# Patient Record
Sex: FEMALE | Race: OTHER | NOT HISPANIC OR LATINO | URBAN - METROPOLITAN AREA
[De-identification: names, ages, dates, MRNs, and addresses within clinical notes are randomized per-mention and may not be internally consistent; named-entity substitution may affect disease eponyms.]

---

## 2023-10-16 ENCOUNTER — EMERGENCY (EMERGENCY)
Facility: HOSPITAL | Age: 28
LOS: 0 days | Discharge: HOME | End: 2023-10-16
Attending: EMERGENCY MEDICINE
Payer: MEDICAID

## 2023-10-16 VITALS
WEIGHT: 111.99 LBS | SYSTOLIC BLOOD PRESSURE: 139 MMHG | RESPIRATION RATE: 16 BRPM | HEART RATE: 81 BPM | OXYGEN SATURATION: 99 % | DIASTOLIC BLOOD PRESSURE: 81 MMHG | TEMPERATURE: 98 F | HEIGHT: 72 IN

## 2023-10-16 VITALS
TEMPERATURE: 98 F | OXYGEN SATURATION: 99 % | RESPIRATION RATE: 18 BRPM | DIASTOLIC BLOOD PRESSURE: 76 MMHG | SYSTOLIC BLOOD PRESSURE: 125 MMHG | HEART RATE: 75 BPM
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
## 2023-10-16 DIAGNOSIS — F17.200 NICOTINE DEPENDENCE, UNSPECIFIED, UNCOMPLICATED: ICD-10-CM
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
## 2023-10-16 DIAGNOSIS — R07.89 OTHER CHEST PAIN: ICD-10-CM

## 2023-10-16 DIAGNOSIS — M54.50 LOW BACK PAIN, UNSPECIFIED: ICD-10-CM

## 2023-10-16 DIAGNOSIS — S32.2XXA FRACTURE OF COCCYX, INITIAL ENCOUNTER FOR CLOSED FRACTURE: ICD-10-CM

## 2023-10-16 DIAGNOSIS — R07.81 PLEURODYNIA: ICD-10-CM

## 2023-10-16 DIAGNOSIS — J45.909 UNSPECIFIED ASTHMA, UNCOMPLICATED: ICD-10-CM
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
## 2023-10-16 DIAGNOSIS — M25.521 PAIN IN RIGHT ELBOW: ICD-10-CM

## 2023-10-16 PROCEDURE — 99284 EMERGENCY DEPT VISIT MOD MDM: CPT | Mod: 25

## 2023-10-16 PROCEDURE — 72220 X-RAY EXAM SACRUM TAILBONE: CPT | Mod: 26

## 2023-10-16 PROCEDURE — 72220 X-RAY EXAM SACRUM TAILBONE: CPT

## 2023-10-16 PROCEDURE — 71045 X-RAY EXAM CHEST 1 VIEW: CPT

## 2023-10-16 PROCEDURE — 71045 X-RAY EXAM CHEST 1 VIEW: CPT | Mod: 26

## 2023-10-16 PROCEDURE — 99284 EMERGENCY DEPT VISIT MOD MDM: CPT

## 2023-10-16 PROCEDURE — 96372 THER/PROPH/DIAG INJ SC/IM: CPT

## 2023-10-16 RX ORDER — KETOROLAC TROMETHAMINE 30 MG/ML
30 SYRINGE (ML) INJECTION ONCE
Refills: 0 | Status: DISCONTINUED | OUTPATIENT
Start: 2023-10-16 | End: 2023-10-16

## 2023-10-16 RX ADMIN — Medication 30 MILLIGRAM(S): at 22:06

## 2023-10-16 RX ADMIN — Medication 30 MILLIGRAM(S): at 21:36

## 2023-10-16 NOTE — ED ADULT NURSE NOTE - NSFALLRISKINTERV_ED_ALL_ED

## 2023-10-16 NOTE — ED PROVIDER NOTE - NSFOLLOWUPINSTRUCTIONS_ED_ALL_ED_FT
Use donut cushion when sitting down.     Tailbone Injury  A side view of a person's spine with a close-up showing the coccyx.  The tailbone is the small bone at the lower end of the spine. The tailbone is also called the coccyx.A tailbone injury may involve stretched ligaments, bruising, or a broken bone/break (fracture). Tailbone injuries can be painful, and some may take a long time to heal.    What are the causes?  This condition may be caused by:  Falling and landing on the tailbone.  Repeated strain or friction from sitting for long periods of time. This may include actions such as rowing or bicycling.  Childbirth.  In some cases, the cause may not be known.    What are the signs or symptoms?  Symptoms of this condition include:  Pain in the tailbone area or lower back, especially when sitting.  Pain or difficulty when standing up from a sitting position.  Bruising or swelling in the tailbone area.  Painful bowel movements.  In females, pain during sex.  How is this diagnosed?  This condition may be diagnosed based on your symptoms and a physical exam.    If your health care provider suspects a fracture, you may have additional tests, such as:  X-rays.  CT scan.  MRI.  How is this treated?  Most tailbone injuries heal on their own in 4–6 weeks. However, recovery time may be longer if there is a fracture.    If treatment is needed, it may include:  NSAIDs or other over-the-counter medicines to help relieve your pain.  Rubber or inflated ring or cushion to take pressure off the tailbone when sitting.  Physical therapy.  Injection with local anesthesia and steroid medicine. This is done only if the pain does not improve over time with over-the-counter pain medicines.  Follow these instructions at home:  Activity    Rest as told by your health care provider.  Do not sit for a long time without moving. Get up to take short walks every 1–2 hours. This will improve blood flow and breathing. Ask for help if you feel weak or unsteady.  Wear appropriate padding and sports gear when bicycling or rowing. This will prevent repeating an injury that is caused by strain or friction.  Do exercises as told by your health care provider.  Increase your activity as the pain allows.  Return to your normal activities as told by your health care provider. Ask your health care provider what activities are safe for you.  Managing pain, stiffness, and swelling    To help decrease discomfort when sitting:  Sit on your rubber or inflated ring or cushion as told by your health care provider.  Lean forward when you sit.  If directed, put ice on the injured area. To do this:  Put ice in a plastic bag.  Place a towel between your skin and the bag.  Leave the ice on for 20 minutes, 2–3 times per day for the first 1–2 days.  If your skin turns bright red, remove the ice right away to prevent skin damage. The risk of skin damage is higher if you cannot feel pain, heat, or cold.  If directed, apply heat to the affected area as often as told by your health care provider. Use the heat source that your health care provider recommends, such as a moist heat pack or a heating pad.  Place a towel between your skin and the heat source.  Leave the heat on for 20–30 minutes.  If your skin turns bright red, remove the heat right away to prevent burns. The risk of burns is higher if you cannot feel pain, heat, or cold.  General instructions    Take over-the-counter and prescription medicines only as told by your health care provider.  Your condition or medicine may cause constipation or painful bowel movements. To prevent or treat constipation, you may need to:  Drink enough fluid to keep your urine pale yellow.  Take over-the-counter or prescription medicines.  Eat foods that are high in fiber, such as beans, whole grains, and fresh fruits and vegetables.  Limit foods that are high in fat and processed sugars, such as fried or sweet foods.  Contact a health care provider if:  Your pain becomes worse or is not controlled with medicine.  Your bowel movements cause a great deal of discomfort.  You are unable to have a bowel movement after 4 days.  You have pain during sex.  Summary  A tailbone injury may involve stretched ligaments, bruising, or a broken bone/break (fracture).  Tailbone injuries can be painful. Most heal on their own in 4–6 weeks.  Treatment may include taking NSAIDs, doing physical therapy, or using a rubber or inflated ring or cushion when sitting.  Follow any recommendations from your health care provider to prevent or treat constipation.  This information is not intended to replace advice given to you by your health care provider. Make sure you discuss any questions you have with your health care provider.

## 2023-10-16 NOTE — ED PROVIDER NOTE - ATTENDING CONTRIBUTION TO CARE
26 yo F with PMH of asthma presents to the ER for tailbone pain after a fall 2 days ago. States she was carrying a box down the stairs, slipped and fell onto her buttocks and then slid down the stairs. Now with pain to the tailbone and to the right side of her anterior chest. Denies any LOC or use of blood thinners. Denies any N/V/D/SOB/abdomen pain/hematuria/rash/bruising/cough/fever/chills/dizziness/palpitations/calf pain/numbness or weakness. +pain when sitting to her sacrum area. No issues with BMs (no blood, no incontinence etc)    AVSS Pt in NAD, NCAT, PERRL, EOMI. No c-spine/T/L spine tenderness or step off. +mild lower sacrum/coccygeal discomfort with palpation, with no associated skin discoloration or edema. No numbness to this region (sacral/buttock). Lungs: CTAB. Heart: regular s1s2. Chest wall: +mild tenderness to anterior right lower ribs with no surrounding bruising/swelling/redness or crepitus. Abdomen: soft nt/nd +BS, no mass. no rebound or guarding, Ext no edema, no calf tenderness, no extremity joint swelling/deformity, +FROM of all extremities. Neuro-motor-vascular exam intact. Neuro: intact.     A/P pt likely injured her tailbone. Will check XRs of chest/ribs and sacrum/coccyx. Recommend NSAIDs for pain control and use of a donut shaped cushion for her to place under her when sitting. Recommend f/u with PMD for reevaluation in 2 weeks. Return precautions given. Informed this type of injury generally just requires time to heal on own.     ALL: nkda  Meds albuterol prn, ibuprofen  SH +smoker  LMP last month, has regular periods.

## 2023-10-16 NOTE — ED ADULT TRIAGE NOTE - CHIEF COMPLAINT QUOTE
"2 nights ago, I slid down a flight of stairs, about 10 steps. My tailbone hurts." Denies head injury, denies LOC.

## 2023-10-16 NOTE — ED PROVIDER NOTE - CLINICAL SUMMARY MEDICAL DECISION MAKING FREE TEXT BOX
26 yo F with PMH of asthma presents to the ER for tailbone pain after a fall 2 days ago. States she was carrying a box down the stairs, slipped and fell onto her buttocks and then slid down the stairs. Now with pain to the tailbone and to the right side of her anterior chest. Denies any LOC or use of blood thinners. Denies any N/V/D/SOB/abdomen pain/hematuria/rash/bruising/cough/fever/chills/dizziness/palpitations/calf pain/numbness or weakness. +pain when sitting to her sacrum area. No issues with BMs (no blood, no incontinence etc)    AVSS Pt in NAD, NCAT, PERRL, EOMI. No c-spine/T/L spine tenderness or step off. +mild lower sacrum/coccygeal discomfort with palpation, with no associated skin discoloration or edema. No numbness to this region (sacral/buttock). Lungs: CTAB. Heart: regular s1s2. Chest wall: +mild tenderness to anterior right lower ribs with no surrounding bruising/swelling/redness or crepitus. Abdomen: soft nt/nd +BS, no mass. no rebound or guarding, Ext no edema, no calf tenderness, no extremity joint swelling/deformity, +FROM of all extremities. Neuro-motor-vascular exam intact. Neuro: intact.     A/P pt likely injured her tailbone. Will check XRs of chest/ribs and sacrum/coccyx. Recommend NSAIDs for pain control and use of a donut shaped cushion for her to place under her when sitting. Recommend f/u with PMD for reevaluation in 2 weeks. Return precautions given. Informed this type of injury generally just requires time to heal on own.

## 2023-10-16 NOTE — ED PROVIDER NOTE - PATIENT PORTAL LINK FT
You can access the FollowMyHealth Patient Portal offered by [facility] by registering at the following website: [email or web address]. By joining [employer]’s FollowMyHealth portal, you will also be able to view your health information using other applications (apps) compatible with our system.

## 2023-10-16 NOTE — ED PROVIDER NOTE - PHYSICAL EXAMINATION
CONSTITUTIONAL: Well-developed; well-nourished; in no acute distress.   SKIN: Warm, dry  HEAD: Normocephalic; atraumatic  EYES: EOMI, normal sclera and conjunctiva   ENT: No nasal discharge; airway clear.  CARD:  Regular rate and rhythm. Normal S1, S2. 2+ radial, DP pulses  RESP: No increased WOB. CTA b/l without wheezes, crackles, rhonchi. Mild right anterior chest wall pain.   ABD: Soft, nontender, nondistended. No CVA tenderness  BACK: No midline spinal tenderness C/T/L. Tenderness over coccyx.   EXT: Normal ROM. Upper and lower extremity joints no deformities or tenderness.   NEURO: Alert, oriented, grossly unremarkable  PSYCH: Cooperative, appropriate.

## 2023-10-16 NOTE — ED PROVIDER NOTE - OBJECTIVE STATEMENT
26 y/o F with no PMH presenting for lower back pain x2 days. Pt reports she slipped while going down stairs holding a box. Slid down approximately 7-8 steps. Also reports anterior chest pain and right elbow pain. Denies difficulties breathing, SOB on exertion, saddle anesthesia, changes in urination/defecation.

## 2023-11-03 ENCOUNTER — EMERGENCY (EMERGENCY)
Facility: HOSPITAL | Age: 28
LOS: 0 days | Discharge: HOME | End: 2023-11-03
Attending: EMERGENCY MEDICINE
Payer: MEDICAID

## 2023-11-03 VITALS
HEIGHT: 72 IN | DIASTOLIC BLOOD PRESSURE: 73 MMHG | TEMPERATURE: 100 F | RESPIRATION RATE: 19 BRPM | SYSTOLIC BLOOD PRESSURE: 141 MMHG | OXYGEN SATURATION: 96 % | HEART RATE: 100 BPM | WEIGHT: 111.99 LBS

## 2023-11-03 DIAGNOSIS — R05.9 COUGH, UNSPECIFIED: ICD-10-CM

## 2023-11-03 DIAGNOSIS — R06.2 WHEEZING: ICD-10-CM

## 2023-11-03 DIAGNOSIS — R09.89 OTHER SPECIFIED SYMPTOMS AND SIGNS INVOLVING THE CIRCULATORY AND RESPIRATORY SYSTEMS: ICD-10-CM

## 2023-11-03 DIAGNOSIS — R09.81 NASAL CONGESTION: ICD-10-CM

## 2023-11-03 DIAGNOSIS — J02.9 ACUTE PHARYNGITIS, UNSPECIFIED: ICD-10-CM

## 2023-11-03 PROCEDURE — 94640 AIRWAY INHALATION TREATMENT: CPT

## 2023-11-03 PROCEDURE — 99283 EMERGENCY DEPT VISIT LOW MDM: CPT | Mod: 25

## 2023-11-03 PROCEDURE — 99283 EMERGENCY DEPT VISIT LOW MDM: CPT

## 2023-11-03 RX ORDER — IBUPROFEN 200 MG
1 TABLET ORAL
Qty: 28 | Refills: 0
Start: 2023-11-03 | End: 2023-11-16

## 2023-11-03 RX ORDER — ALBUTEROL 90 UG/1
1 AEROSOL, METERED ORAL ONCE
Refills: 0 | Status: COMPLETED | OUTPATIENT
Start: 2023-11-03 | End: 2023-11-03

## 2023-11-03 RX ORDER — IBUPROFEN 200 MG
400 TABLET ORAL ONCE
Refills: 0 | Status: COMPLETED | OUTPATIENT
Start: 2023-11-03 | End: 2023-11-03

## 2023-11-03 RX ADMIN — Medication 400 MILLIGRAM(S): at 09:40

## 2023-11-03 RX ADMIN — ALBUTEROL 1 PUFF(S): 90 AEROSOL, METERED ORAL at 09:40

## 2023-11-03 NOTE — ED PROVIDER NOTE - PATIENT PORTAL LINK FT
You can access the FollowMyHealth Patient Portal offered by [facility] by registering at the following website: [email or web address]. By joining [employer]’s FollowMyHealth portal, you will also be able to view your health information using other applications (apps) compatible with our system.

## 2023-11-03 NOTE — ED PROVIDER NOTE - PHYSICAL EXAMINATION
EXAM:  CONSTITUTIONAL: WA / WN / NAD  HEAD: NCAT  EYES: PERRL; EOMI; anicteric.  ENT: boggy turbinates, mucus in nose  NECK: Supple; no meningeal signs  CARD: RRR; nl S1/S2; no M/R/G. Pulses equal bilaterally.  RESP: Respiratory rate and effort are normal; scattered wheeze  ABD: Soft, NT ND nl bowel sounds; no masses; no rebound  MSK/EXT: No gross deformities; full range of motion.  SKIN: Warm and dry;   NEURO: AAOx3, Motor 5/5 x 4 extremities, Sensations intact to pain and palpation, Cerebellar testing normal  PSYCH: Memory Intact, Normal Affect

## 2023-11-03 NOTE — ED PROVIDER NOTE - OBJECTIVE STATEMENT
27-year-old female to ED with cough cold congestion times few days.  Runny nose sore throat no help with home meds and persistent congestion concern for aggravating her asthma and she ran out of inhalers to ED for eval.  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

## 2023-11-03 NOTE — ED ADULT NURSE NOTE - NSFALLUNIVINTERV_ED_ALL_ED
Bed/Stretcher in lowest position, wheels locked, appropriate side rails in place/Call bell, personal items and telephone in reach/Instruct patient to call for assistance before getting out of bed/chair/stretcher/Non-slip footwear applied when patient is off stretcher/[place] to call system/Physically safe environment - no spills, clutter or unnecessary equipment/Purposeful proactive rounding/Room/bathroom lighting operational, light cord in reach

## 2024-01-27 ENCOUNTER — EMERGENCY (EMERGENCY)
Facility: HOSPITAL | Age: 29
LOS: 0 days | Discharge: LEFT BEFORE DISCHARGE | End: 2024-01-28
Attending: EMERGENCY MEDICINE
Payer: MEDICAID

## 2024-01-27 VITALS
WEIGHT: 151.02 LBS | OXYGEN SATURATION: 100 % | TEMPERATURE: 98 F | HEART RATE: 59 BPM | DIASTOLIC BLOOD PRESSURE: 83 MMHG | SYSTOLIC BLOOD PRESSURE: 142 MMHG | RESPIRATION RATE: 18 BRPM

## 2024-01-27 DIAGNOSIS — K08.89 OTHER SPECIFIED DISORDERS OF TEETH AND SUPPORTING STRUCTURES: ICD-10-CM

## 2024-01-27 DIAGNOSIS — Z53.29 PROCEDURE AND TREATMENT NOT CARRIED OUT BECAUSE OF PATIENT'S DECISION FOR OTHER REASONS: ICD-10-CM

## 2024-01-27 DIAGNOSIS — K04.7 PERIAPICAL ABSCESS WITHOUT SINUS: ICD-10-CM

## 2024-01-27 DIAGNOSIS — R22.0 LOCALIZED SWELLING, MASS AND LUMP, HEAD: ICD-10-CM

## 2024-01-27 LAB
ANION GAP SERPL CALC-SCNC: 11 MMOL/L — SIGNIFICANT CHANGE UP (ref 7–14)
BASOPHILS # BLD AUTO: 0.02 K/UL — SIGNIFICANT CHANGE UP (ref 0–0.2)
BASOPHILS NFR BLD AUTO: 0.2 % — SIGNIFICANT CHANGE UP (ref 0–1)
BUN SERPL-MCNC: 9 MG/DL — LOW (ref 10–20)
CALCIUM SERPL-MCNC: 9.3 MG/DL — SIGNIFICANT CHANGE UP (ref 8.4–10.5)
CHLORIDE SERPL-SCNC: 103 MMOL/L — SIGNIFICANT CHANGE UP (ref 98–110)
CO2 SERPL-SCNC: 23 MMOL/L — SIGNIFICANT CHANGE UP (ref 17–32)
CREAT SERPL-MCNC: 0.6 MG/DL — LOW (ref 0.7–1.5)
EGFR: 127 ML/MIN/1.73M2 — SIGNIFICANT CHANGE UP
EOSINOPHIL # BLD AUTO: 0.01 K/UL — SIGNIFICANT CHANGE UP (ref 0–0.7)
EOSINOPHIL NFR BLD AUTO: 0.1 % — SIGNIFICANT CHANGE UP (ref 0–8)
GLUCOSE SERPL-MCNC: 94 MG/DL — SIGNIFICANT CHANGE UP (ref 70–99)
HCG SERPL QL: NEGATIVE — SIGNIFICANT CHANGE UP
HCT VFR BLD CALC: 38.8 % — SIGNIFICANT CHANGE UP (ref 37–47)
HGB BLD-MCNC: 13.3 G/DL — SIGNIFICANT CHANGE UP (ref 12–16)
IMM GRANULOCYTES NFR BLD AUTO: 0.4 % — HIGH (ref 0.1–0.3)
LYMPHOCYTES # BLD AUTO: 1.53 K/UL — SIGNIFICANT CHANGE UP (ref 1.2–3.4)
LYMPHOCYTES # BLD AUTO: 17.9 % — LOW (ref 20.5–51.1)
MCHC RBC-ENTMCNC: 33 PG — HIGH (ref 27–31)
MCHC RBC-ENTMCNC: 34.3 G/DL — SIGNIFICANT CHANGE UP (ref 32–37)
MCV RBC AUTO: 96.3 FL — SIGNIFICANT CHANGE UP (ref 81–99)
MONOCYTES # BLD AUTO: 0.7 K/UL — HIGH (ref 0.1–0.6)
MONOCYTES NFR BLD AUTO: 8.2 % — SIGNIFICANT CHANGE UP (ref 1.7–9.3)
NEUTROPHILS # BLD AUTO: 6.27 K/UL — SIGNIFICANT CHANGE UP (ref 1.4–6.5)
NEUTROPHILS NFR BLD AUTO: 73.2 % — SIGNIFICANT CHANGE UP (ref 42.2–75.2)
NRBC # BLD: 0 /100 WBCS — SIGNIFICANT CHANGE UP (ref 0–0)
PLATELET # BLD AUTO: 249 K/UL — SIGNIFICANT CHANGE UP (ref 130–400)
PMV BLD: 10.3 FL — SIGNIFICANT CHANGE UP (ref 7.4–10.4)
POTASSIUM SERPL-MCNC: 4.7 MMOL/L — SIGNIFICANT CHANGE UP (ref 3.5–5)
POTASSIUM SERPL-SCNC: 4.7 MMOL/L — SIGNIFICANT CHANGE UP (ref 3.5–5)
RBC # BLD: 4.03 M/UL — LOW (ref 4.2–5.4)
RBC # FLD: 12.4 % — SIGNIFICANT CHANGE UP (ref 11.5–14.5)
SODIUM SERPL-SCNC: 137 MMOL/L — SIGNIFICANT CHANGE UP (ref 135–146)
WBC # BLD: 8.56 K/UL — SIGNIFICANT CHANGE UP (ref 4.8–10.8)
WBC # FLD AUTO: 8.56 K/UL — SIGNIFICANT CHANGE UP (ref 4.8–10.8)

## 2024-01-27 PROCEDURE — 36415 COLL VENOUS BLD VENIPUNCTURE: CPT

## 2024-01-27 PROCEDURE — 84703 CHORIONIC GONADOTROPIN ASSAY: CPT

## 2024-01-27 PROCEDURE — 85025 COMPLETE CBC W/AUTO DIFF WBC: CPT

## 2024-01-27 PROCEDURE — 96374 THER/PROPH/DIAG INJ IV PUSH: CPT | Mod: XU

## 2024-01-27 PROCEDURE — 70491 CT SOFT TISSUE NECK W/DYE: CPT | Mod: 26,MA

## 2024-01-27 PROCEDURE — 99284 EMERGENCY DEPT VISIT MOD MDM: CPT | Mod: 25

## 2024-01-27 PROCEDURE — 70491 CT SOFT TISSUE NECK W/DYE: CPT | Mod: MA

## 2024-01-27 PROCEDURE — 99285 EMERGENCY DEPT VISIT HI MDM: CPT

## 2024-01-27 PROCEDURE — 80048 BASIC METABOLIC PNL TOTAL CA: CPT

## 2024-01-27 PROCEDURE — 96375 TX/PRO/DX INJ NEW DRUG ADDON: CPT | Mod: XU

## 2024-01-27 RX ORDER — FAMOTIDINE 10 MG/ML
20 INJECTION INTRAVENOUS ONCE
Refills: 0 | Status: COMPLETED | OUTPATIENT
Start: 2024-01-27 | End: 2024-01-27

## 2024-01-27 RX ORDER — ACETAMINOPHEN 500 MG
975 TABLET ORAL ONCE
Refills: 0 | Status: COMPLETED | OUTPATIENT
Start: 2024-01-27 | End: 2024-01-27

## 2024-01-27 RX ORDER — MORPHINE SULFATE 50 MG/1
4 CAPSULE, EXTENDED RELEASE ORAL ONCE
Refills: 0 | Status: DISCONTINUED | OUTPATIENT
Start: 2024-01-27 | End: 2024-01-27

## 2024-01-27 RX ORDER — AMPICILLIN SODIUM AND SULBACTAM SODIUM 250; 125 MG/ML; MG/ML
3 INJECTION, POWDER, FOR SUSPENSION INTRAMUSCULAR; INTRAVENOUS ONCE
Refills: 0 | Status: COMPLETED | OUTPATIENT
Start: 2024-01-27 | End: 2024-01-27

## 2024-01-27 RX ORDER — DIPHENHYDRAMINE HCL 50 MG
25 CAPSULE ORAL ONCE
Refills: 0 | Status: COMPLETED | OUTPATIENT
Start: 2024-01-27 | End: 2024-01-27

## 2024-01-27 RX ORDER — IBUPROFEN 200 MG
600 TABLET ORAL ONCE
Refills: 0 | Status: COMPLETED | OUTPATIENT
Start: 2024-01-27 | End: 2024-01-27

## 2024-01-27 RX ORDER — SODIUM CHLORIDE 9 MG/ML
1000 INJECTION INTRAMUSCULAR; INTRAVENOUS; SUBCUTANEOUS ONCE
Refills: 0 | Status: COMPLETED | OUTPATIENT
Start: 2024-01-27 | End: 2024-01-27

## 2024-01-27 RX ADMIN — Medication 600 MILLIGRAM(S): at 23:26

## 2024-01-27 RX ADMIN — Medication 975 MILLIGRAM(S): at 21:40

## 2024-01-27 RX ADMIN — MORPHINE SULFATE 4 MILLIGRAM(S): 50 CAPSULE, EXTENDED RELEASE ORAL at 19:48

## 2024-01-27 RX ADMIN — AMPICILLIN SODIUM AND SULBACTAM SODIUM 200 GRAM(S): 250; 125 INJECTION, POWDER, FOR SUSPENSION INTRAMUSCULAR; INTRAVENOUS at 19:48

## 2024-01-27 RX ADMIN — Medication 25 MILLIGRAM(S): at 21:07

## 2024-01-27 RX ADMIN — FAMOTIDINE 20 MILLIGRAM(S): 10 INJECTION INTRAVENOUS at 19:13

## 2024-01-27 RX ADMIN — Medication 125 MILLIGRAM(S): at 19:13

## 2024-01-27 RX ADMIN — Medication 25 MILLIGRAM(S): at 19:45

## 2024-01-27 RX ADMIN — SODIUM CHLORIDE 1000 MILLILITER(S): 9 INJECTION INTRAMUSCULAR; INTRAVENOUS; SUBCUTANEOUS at 19:50

## 2024-01-28 ENCOUNTER — EMERGENCY (EMERGENCY)
Facility: HOSPITAL | Age: 29
LOS: 0 days | Discharge: HOME | End: 2024-01-28
Attending: STUDENT IN AN ORGANIZED HEALTH CARE EDUCATION/TRAINING PROGRAM
Payer: MEDICAID

## 2024-01-28 VITALS
SYSTOLIC BLOOD PRESSURE: 137 MMHG | TEMPERATURE: 98 F | RESPIRATION RATE: 18 BRPM | WEIGHT: 121.03 LBS | DIASTOLIC BLOOD PRESSURE: 70 MMHG | HEART RATE: 75 BPM | OXYGEN SATURATION: 99 %

## 2024-01-28 DIAGNOSIS — K08.89 OTHER SPECIFIED DISORDERS OF TEETH AND SUPPORTING STRUCTURES: ICD-10-CM

## 2024-01-28 DIAGNOSIS — K02.9 DENTAL CARIES, UNSPECIFIED: ICD-10-CM

## 2024-01-28 DIAGNOSIS — K04.7 PERIAPICAL ABSCESS WITHOUT SINUS: ICD-10-CM

## 2024-01-28 PROCEDURE — 99283 EMERGENCY DEPT VISIT LOW MDM: CPT

## 2024-01-28 PROCEDURE — 99284 EMERGENCY DEPT VISIT MOD MDM: CPT

## 2024-01-28 RX ORDER — IBUPROFEN 200 MG
1 TABLET ORAL
Qty: 12 | Refills: 0
Start: 2024-01-28 | End: 2024-01-30

## 2024-01-28 RX ORDER — KETOROLAC TROMETHAMINE 30 MG/ML
30 SYRINGE (ML) INJECTION ONCE
Refills: 0 | Status: DISCONTINUED | OUTPATIENT
Start: 2024-01-28 | End: 2024-01-28

## 2024-01-28 RX ADMIN — Medication 30 MILLIGRAM(S): at 06:31

## 2024-01-28 NOTE — ED PROVIDER NOTE - CLINICAL SUMMARY MEDICAL DECISION MAKING FREE TEXT BOX
.    Patient here for evaluation of dental pain/periapical abscess.  Patient evaluated by dental service, recommended I&D.  Patient preferred not to have I&D, preferring instead antibiotic therapy.  Antibiotics sent to pharmacy.  Patient pain improved.  Patient is stable for discharge home with outpatient dental follow-up.    .

## 2024-01-28 NOTE — ED PROVIDER NOTE - NSPTACCESSSVCSAPPTDETAILS_ED_ALL_ED_FT
Our Emergency Department Referral Coordinators will be reaching out to you in the next 24-48 hours from 9:00am to 5:00pm with a follow up appointment. Please expect a phone call from the hospital in that time frame. If you do not receive a call or if you have any questions or concerns, you can reach them at   [phone]

## 2024-01-28 NOTE — ED ADULT NURSE NOTE - NSFALLUNIVINTERV_ED_ALL_ED
Bed/Stretcher in lowest position, wheels locked, appropriate side rails in place/Call bell, personal items and telephone in reach/Instruct patient to call for assistance before getting out of bed/chair/stretcher/Non-slip footwear applied when patient is off stretcher/[place] to call system/Physically safe environment - no spills, clutter or unnecessary equipment/Purposeful proactive rounding/Room/bathroom lighting operational, light cord in reach

## 2024-01-28 NOTE — CONSULT NOTE ADULT - ASSESSMENT
Patient is a 28y old  Female who presents with a chief complaint of "Last night my face started to get swollen and im having trouble opening my mouth all of the way. They told me to come back today to have a procedure done."    HPI: Right buccal swelling began last night       PAST MEDICAL & SURGICAL HISTORY:    ( - ) heart valve replacement  ( - ) joint replacement  ( - ) pregnancy    MEDICATIONS  (STANDING):    MEDICATIONS  (PRN):      Allergies    No Known Allergies    Intolerances        FAMILY HISTORY:      *Last Dental Visit: Unknown    Vital Signs Last 24 Hrs  T(C): 36.6 (28 Jan 2024 05:11), Max: 36.6 (27 Jan 2024 17:14)  T(F): 97.9 (28 Jan 2024 05:11), Max: 97.9 (27 Jan 2024 17:14)  HR: 75 (28 Jan 2024 05:11) (59 - 75)  BP: 137/70 (28 Jan 2024 05:11) (137/70 - 142/83)  BP(mean): --  RR: 18 (28 Jan 2024 05:11) (18 - 18)  SpO2: 99% (28 Jan 2024 05:11) (99% - 100%)    Parameters below as of 28 Jan 2024 05:11  Patient On (Oxygen Delivery Method): room air        LABS:                        13.3   8.56  )-----------( 249      ( 27 Jan 2024 19:10 )             38.8     01-27    137  |  103  |  9<L>  ----------------------------<  94  4.7   |  23  |  0.6<L>    Ca    9.3      27 Jan 2024 19:10      WBC Count: 8.56 K/uL [4.80 - 10.80] (01-27 @ 19:10)  Platelet Count - Automated: 249 K/uL [130 - 400] (01-27 @ 19:10)    Urinalysis Basic - ( 27 Jan 2024 19:10 )    Color: x / Appearance: x / SG: x / pH: x  Gluc: 94 mg/dL / Ketone: x  / Bili: x / Urobili: x   Blood: x / Protein: x / Nitrite: x   Leuk Esterase: x / RBC: x / WBC x   Sq Epi: x / Non Sq Epi: x / Bacteria: x          EOE:  TMJ ( - ) clicks                     ( - ) pops                     ( - ) crepitus             Mandible <<FROM>>             Facial bones and MOM <<grossly intact>>             ( - ) trismus             ( - ) lymphadenopathy             ( + ) swelling             ( + ) asymmetry             ( + ) palpation             ( - ) dyspnea             ( - ) dysphagia             ( - ) loss of consciousness    IOE:  permanent dentition: multiple carious teeth, poor oral hygiene           hard/soft palate: <<No pathology noted>>           tongue/FOM <<No pathology noted>>           labial/buccal mucosa <<No pathology noted>>           ( + ) percussion           ( + ) palpation           ( + ) swelling            ( + ) abscess           ( - ) sinus tract        *DENTAL RADIOGRAPHS: None taken    RADIOLOGY & ADDITIONAL STUDIES: Ct scan shown maxillary right abscess associated with non-restorable #2 and right buccal inflammatory changes    *ASSESSMENT: Nonrestorable tooth #2      *PLAN: Discussed treatment options with patient. Recommended strongly that Incision and drainage should be done to prevent further complications and encourage healing. Patient claims "I watched "[employer]ube videos last night of the procedure and it made me scared and I do not want the procedure done". Explained risks of not having the procedure done to patient, she understands. Recommended patient return to clinic to have non-restorable tooth taken out. Patient agrees to return to come to dental clinic to have tooth taken out. Instructed patient to take antibiotics as soon as possible.       RECOMMENDATIONS:  1) Complete antiobiotic course as prescribed and analgesics as necessary for pain  2) Dental F/U with outpatient dentist for comprehensive dental care or [facility] dental clinic   3) If any difficulty swallowing/breathing, fever occur, return to ER.     Resident Name: Trinh Huff, pager #[phone]

## 2024-01-28 NOTE — ED ADULT NURSE NOTE - OBJECTIVE STATEMENT
Pt presented to ed complaining of dental pain/ injury. swelling noted. pt is ao, shows no s/s of sob, labored breathing, pt denies chest pain.

## 2024-01-28 NOTE — ED PROVIDER NOTE - OBJECTIVE STATEMENT
62-year-old female no past medical history coming here for return visit for dental pain.  Was seen earlier last night for similar complaints and had workup including CAT scan and blood work.  Was found to have a periapical abscess and told to come back for drainage by dental resident.  Dental resident starts at 8 AM.

## 2024-01-28 NOTE — ED ADULT NURSE NOTE - PAIN: PRESENCE, MLM
complains of pain/discomfort
pt c/o chest palpitations for 4 months after covid in January states cp, sod for the last few days  denies fever,chills,n/v

## 2024-01-28 NOTE — ED PROVIDER NOTE - NSFOLLOWUPCLINICS_GEN_ALL_ED_FT
[facility]  Dental  [address] [place] [id number]  Phone: [phone]  Fax:   Follow Up Time: 1-3 Days

## 2024-01-28 NOTE — ED PROVIDER NOTE - NSFOLLOWUPINSTRUCTIONS_ED_ALL_ED_FT
Follow-up with dentist.  Take antibiotics as prescribed.      Dental Abscess  Cross-sectional view of two teeth: one tooth is normal and the other tooth has an abscess.  A dental abscess is an area of pus in or around a tooth. It comes from an infection. It can cause pain and other symptoms. Treatment will help with symptoms and prevent the infection from spreading.    What are the causes?  This condition is caused by an infection in or around the tooth. This can be from:  Very bad tooth decay (cavities).  A bad injury to the tooth, such as a broken or chipped tooth.  What increases the risk?  The risk to get an abscess is higher in males. It is also more likely in people who:  Have dental decay.  Have very bad gum disease.  Eat sugary snacks between meals.  Use tobacco.  Have diabetes.  Have a weak disease-fighting system (immune system).  Do not brush their teeth regularly.  What are the signs or symptoms?  Some mild symptoms are:  Tenderness.  Bad breath.  Fever.  A sharp, sour taste in the mouth.  Pain in and around the infected tooth.  Worse symptoms of this condition include:  Swollen neck glands.  Chills.  Pus draining around the tooth.  Swelling and redness around the tooth, the mouth, or the face.  Very bad pain in and around the tooth.  The worst symptoms can include:  Difficulty swallowing.  Difficulty opening your mouth.  Feeling like you may vomit or vomiting.  How is this treated?  This is treated by getting rid of the infection. Your dentist will discuss ways to do this, including:  Antibiotic medicines.  Antibacterial mouth rinse.  An incision in the abscess to drain out the pus.  A root canal.  Removing the tooth.  Follow these instructions at home:  Medicines    Take over-the-counter and prescription medicines only as told by your dentist.  If you were prescribed an antibiotic medicine, take it as told by your dentist. Do not stop taking it even if you start to feel better.  If you were prescribed a gel that has numbing medicine in it, use it exactly as told.  Ask your dentist if you should avoid driving or using machines while you are taking your medicine.  General instructions    Rinse your mouth often with salt water. To make salt water, dissolve ½–1 tsp (3–6 g) of salt in 1 cup (237 mL) of warm water.  Eat a soft diet while your mouth is healing.  Drink enough fluid to keep your pee (urine) pale yellow.  Do not apply heat to the outside of your mouth.  Do not smoke or use any products that contain nicotine or tobacco. If you need help quitting, ask your dentist.  Keep all follow-up visits.  Prevent an abscess    Brush your teeth every morning and every night. Use fluoride toothpaste.  Floss your teeth each day.  Get dental cleanings as often as told by your dentist.  Think about getting dental sealant put on teeth that have deep holes (decay).  Drink water that has fluoride in it.  Most tap water has fluoride.  Check the label on bottled water to see if it has fluoride in it.  Drink water instead of sugary drinks.  Eat healthy meals and snacks.  Wear a mouth guard or face shield when you play sports.  Contact a doctor if:  Your pain is worse and medicine does not help.  Get help right away if:  You have a fever or chills.  Your symptoms suddenly get worse.  You have a very bad headache.  You have problems breathing or swallowing.  You have trouble opening your mouth.  You have swelling in your neck or close to your eye.  These symptoms may be an emergency. Get help right away. Call your local emergency services (911 in the [place]  Do not wait to see if the symptoms will go away.  Do not drive yourself to the hospital.  Summary  A dental abscess is an area of pus in or around a tooth. It is caused by an infection.  Treatment will help with symptoms and prevent the infection from spreading.  Take over-the-counter and prescription medicines only as told by your dentist.  To prevent an abscess, take good care of your teeth. Brush your teeth every morning and night. Use floss every day.  Get dental cleanings as often as told by your dentist.  This information is not intended to replace advice given to you by your health care provider. Make sure you discuss any questions you have with your health care provider.

## 2024-01-28 NOTE — ED ADULT NURSE NOTE - HOW OFTEN DO YOU HAVE A DRINK CONTAINING ALCOHOL?
Impression: S/P MAP (Photocoagulation for Maculopathy laser) OS - 36 Days. Encounter for surgical aftercare following surgery on a sense organ  Z48.810. Post operative instructions reviewed - Plan: --Advised patient to use artificial tears for comfort. Never

## 2024-01-28 NOTE — ED PROVIDER NOTE - OBJECTIVE STATEMENT
Patient is c/o Rt lower molar toothace Patient is c/o Rt lower molar toothache x several days, worse today, associated with difficulty opening mouth and Rt sided facial swelling. Denies trauma. +subjective fever/chills. Denies cp/sob/n/v/abd pain. Denies HA/neck pain/vision changes. Denies eye pain. No rash. Rt lower molar toothache is constant, worse with chewing and worse with opening/closing the mouth. Denies trouble speaking/breathing. No drooling noted.

## 2024-01-28 NOTE — ED PROVIDER NOTE - CLINICAL SUMMARY MEDICAL DECISION MAKING FREE TEXT BOX
Laboratory results reviewed and discussed with patient. CBC shows normal WBC count, Hb/Hct and platelet count are WNL. BMP shows electrolytes WNL and no ANA.   Pregnant test is negative.   Patient remained stable in ED, improved well. Patient refused any further medical care in ED, refused admission, refused any further diagnostic studies or treatments. Discussed with patient in detail about clinical condition, and the need for further medical evaluation/treatments, patient verbalized understanding and still refused. Discussed with patient about the risks of leaving AMA, Patient verbalized understanding the information provided and still wanted to leave AMA. Patient is awake, alert, o x 3, coherent, and is capacitated to make decisions. Discussed with patient in detail about clinical condition, results of the diagnostic studies, was told to come back to ED if decide to continue with medical evaluation/treatments, and was discussed about  the need for close out patient follow up. Detail aftercare instructions and return precautions are given.  I had extensive discussion of risks and benefits of pursuing further medical evaluation and/or care with patient and any available family/friends; patient still electing to leave against medical advice. Patient is awake, alert, oriented and demonstrates full capacity and insight into illness. Patient aware and encouraged to return immediately to ED or nearest ED if patient decides to change mind regarding care or if patient experiences any new, worsening, or concerning symptoms.

## 2024-01-28 NOTE — ED PROVIDER NOTE - PATIENT PORTAL LINK FT
You can access the FollowMyHealth Patient Portal offered by [facility] by registering at the following website: [email or web address]. By joining [employer]’s FollowMyHealth portal, you will also be able to view your health information using other applications (apps) compatible with our system.

## 2024-01-28 NOTE — ED PROVIDER NOTE - PHYSICAL EXAMINATION
CONSTITUTIONAL:  in no acute distress.   SKIN: warm, dry  HEAD: Normocephalic; atraumatic.  EYES: PERRL, EOMI, normal sclera and conjunctiva   ENT: No nasal discharge; airway clear.Dental caries with preapical abscess  NECK: Supple; non tender.  CARD:  Regular rate and rhythm.   RESP: NO inc WOB   ABD: soft ntnd  EXT: Normal ROM.    LYMPH: No acute cervical adenopathy.  NEURO: Alert, oriented, grossly unremarkable  PSYCH: Cooperative, appropriate.

## 2024-01-28 NOTE — ED ADULT NURSE NOTE - NSFALLUNIVINTERV_ED_ALL_ED
Bed/Stretcher in lowest position, wheels locked, appropriate side rails in place/Call bell, personal items and telephone in reach/Instruct patient to call for assistance before getting out of bed/chair/stretcher/Non-slip footwear applied when patient is off stretcher/[place] to call system/Physically safe environment - no spills, clutter or unnecessary equipment/Purposeful proactive rounding/Room/bathroom lighting operational, light cord in reach

## 2024-01-28 NOTE — ED PROVIDER NOTE - PHYSICAL EXAMINATION
Face: +mild Rt sided lower face swelling noted. No rash, no facial droop noted.   SALO/EOMI, No pain on ROM of the globe.   +trismus, no drooling.   oral cavity exam: Exam is limited due to trismus, +dental caries, +lower molar gum/tooth tenderness, no obvious abscess is seen and no discharge/drainage noted.   supple neck, +pain on ROM Of the neck and Rt anterior proximal neck tenderness. No lymphadenopathy.

## 2024-01-28 NOTE — ED PROVIDER NOTE - ATTENDING CONTRIBUTION TO CARE
Patient complaining of right tooth pain with mild facial swelling for 4 days.  No fevers.  Seen earlier in the ED with a CAT scan that showed small collection on the right second and third maxillary molar.  Patient was given antibiotics and told to return to the ED in the morning for management by dental on exam there is right facial swelling with area of small abscess in the lower teeth.  Plan is to consult dental

## 2024-01-28 NOTE — ED ADULT NURSE NOTE - OBJECTIVE STATEMENT
Pt ama to grab things from home, came back now. pt is here for dental pain in l lower jaw. pt is ao, shows no s/s of sob, labored breathing, denies chest pain.

## 2024-01-28 NOTE — ED PROVIDER NOTE - CONSULTANT FREE TEXT FOR MDM DISCUSSED CASE WITH QUESTION
Discussed with Dental/OMFS on call, recommended Labs, CT, abx and admission. Dental will evaluate in AM.

## 2024-01-28 NOTE — ED PROVIDER NOTE - DIFFERENTIAL DIAGNOSIS
Electrolyte abnormalities, dehydration, ANA, hyperglycemia, hypoglycemia, symptomatic anemia.  r/o odontogenic abscess. Differential Diagnosis

## 2024-01-28 NOTE — ED ADULT TRIAGE NOTE - CHIEF COMPLAINT QUOTE
pt was seen here today and was told that she has a dental abscess and to come see a dentist in the morning

## 2024-01-28 NOTE — ED PROVIDER NOTE - PROGRESS NOTE DETAILS
AE: Dental made aware and will come see the patient. AE: Patient evaluated by dental resident.  Patient is refusing procedure.  Per dental, patient can be discharged on PO antibiotics and return to dental clinic to have the tooth removed.  Patient is amenable with plan.

## 2024-02-02 ENCOUNTER — EMERGENCY (EMERGENCY)
Facility: HOSPITAL | Age: 29
LOS: 0 days | Discharge: HOME | End: 2024-02-02
Attending: EMERGENCY MEDICINE
Payer: MEDICAID

## 2024-02-02 VITALS
TEMPERATURE: 99 F | HEIGHT: 72 IN | OXYGEN SATURATION: 100 % | RESPIRATION RATE: 20 BRPM | SYSTOLIC BLOOD PRESSURE: 134 MMHG | HEART RATE: 100 BPM | DIASTOLIC BLOOD PRESSURE: 91 MMHG | WEIGHT: 110.89 LBS

## 2024-02-02 DIAGNOSIS — J45.20 MILD INTERMITTENT ASTHMA, UNCOMPLICATED: ICD-10-CM

## 2024-02-02 DIAGNOSIS — F12.90 CANNABIS USE, UNSPECIFIED, UNCOMPLICATED: ICD-10-CM

## 2024-02-02 DIAGNOSIS — B34.9 VIRAL INFECTION, UNSPECIFIED: ICD-10-CM

## 2024-02-02 DIAGNOSIS — R51.9 HEADACHE, UNSPECIFIED: ICD-10-CM

## 2024-02-02 DIAGNOSIS — R50.9 FEVER, UNSPECIFIED: ICD-10-CM

## 2024-02-02 DIAGNOSIS — R09.81 NASAL CONGESTION: ICD-10-CM

## 2024-02-02 PROCEDURE — 93010 ELECTROCARDIOGRAM REPORT: CPT

## 2024-02-02 PROCEDURE — 99284 EMERGENCY DEPT VISIT MOD MDM: CPT

## 2024-02-02 PROCEDURE — 94640 AIRWAY INHALATION TREATMENT: CPT

## 2024-02-02 PROCEDURE — 99284 EMERGENCY DEPT VISIT MOD MDM: CPT | Mod: 25

## 2024-02-02 PROCEDURE — 93005 ELECTROCARDIOGRAM TRACING: CPT

## 2024-02-02 RX ORDER — ALBUTEROL 90 UG/1
1 AEROSOL, METERED ORAL ONCE
Refills: 0 | Status: COMPLETED | OUTPATIENT
Start: 2024-02-02 | End: 2024-02-02

## 2024-02-02 RX ORDER — IPRATROPIUM/ALBUTEROL SULFATE 18-103MCG
3 AEROSOL WITH ADAPTER (GRAM) INHALATION ONCE
Refills: 0 | Status: COMPLETED | OUTPATIENT
Start: 2024-02-02 | End: 2024-02-02

## 2024-02-02 RX ADMIN — ALBUTEROL 1 PUFF(S): 90 AEROSOL, METERED ORAL at 12:34

## 2024-02-02 RX ADMIN — Medication 3 MILLILITER(S): at 10:55

## 2024-02-02 NOTE — ED PROVIDER NOTE - CLINICAL SUMMARY MEDICAL DECISION MAKING FREE TEXT BOX
27 yo F with mild to moderate intermittent asthma now presents with viral symptoms, cough congestion body aches headache nausea and subjective fever.  Ran out of her albuterol.    Nontoxic well-appearing full sentences mild wheeze abdomen soft no calf tenderness.    Given medication labs improved no wheezing at time of discharge stable for discharge

## 2024-02-02 NOTE — ED ADULT TRIAGE NOTE - CHIEF COMPLAINT QUOTE
Pt. c/o cough, body aches, headaches, nausea, and cold sweats x 2 days. Pt. c/o heaviness to the chest, denies SOB. Pt. has hx of asthma. Pt. ran out of her albuterol inhaler.

## 2024-02-02 NOTE — ED PROVIDER NOTE - INTERPRETATION
ED EKG: my independent interpretation - Dr. Mir Jaramillo : Sinus rhythm at 94, right axis deviation, no ST elevations no T inversions

## 2024-02-02 NOTE — ED PROVIDER NOTE - NSFOLLOWUPINSTRUCTIONS_ED_ALL_ED_FT
Follow up with primary care doctor.    Viral Respiratory Infection    A viral respiratory infection is an illness that affects parts of the body used for breathing, like the lungs, nose, and throat. It is caused by a germ called a virus. Symptoms can include runny nose, coughing, sneezing, fatigue, body aches, sore throat, fever, or headache. Over the counter medicine can be used to manage the symptoms but the infection typically goes away on its own in 5 to 10 days.     SEEK IMMEDIATE MEDICAL CARE IF YOU HAVE ANY OF THE FOLLOWING SYMPTOMS: shortness of breath, chest pain, fever over 10 days, or lightheadedness/dizziness.

## 2024-02-02 NOTE — ED ADULT NURSE NOTE - NSFALLUNIVINTERV_ED_ALL_ED
Bed/Stretcher in lowest position, wheels locked, appropriate side rails in place/Call bell, personal items and telephone in reach/Instruct patient to call for assistance before getting out of bed/chair/stretcher/Non-slip footwear applied when patient is off stretcher/[place] to call system/Physically safe environment - no spills, clutter or unnecessary equipment/Purposeful proactive rounding/Room/bathroom lighting operational, light cord in reach

## 2024-02-02 NOTE — ED PROVIDER NOTE - PHYSICAL EXAMINATION
CONSTITUTIONAL: Well-developed; well-nourished; in no acute distress.   SKIN: Warm, dry  HEAD: Normocephalic; atraumatic  EYES: PERRL, EOMI, normal sclera and conjunctiva   ENT: No nasal discharge; airway clear. MMM. Normal oropharynx .  NECK: Supple; non tender.  CARD:  Regular rate and rhythm. Normal S1, S2. 2+ radial pulses. Normal capillary refill.  RESP: No increased WOB. CTA b/l without wheezes, crackles, rhonchi  ABD: Soft, nontender, nondistended. No CVA tenderness  EXT: Normal ROM. No LE edema  LYMPH: No acute cervical adenopathy.  NEURO: Alert, oriented, grossly unremarkable  PSYCH: Cooperative, appropriate.

## 2024-02-02 NOTE — ED PROVIDER NOTE - IV ALTEPLASE EXCL ABS HIDDEN
Problem: NICU 32-33 weeks: Day of Life 4,5,6 
Goal: Activity/Safety Outcome: Progressing Towards Goal 
Pt identification band verified. Pt allowed adequate rest periods between care to promote growth. Velcro name band x 2 in place. Maternal prenatal history on chart. Goal: Consults, if ordered Outcome: Progressing Towards Goal 
No new consultations made at this time. Goal: Diagnostic Test/Procedures Outcome: Progressing Towards Goal 
Bili ordered for 1/1/no labs tonight Goal: Nutrition/Diet Outcome: Progressing Towards Goal 
Pt receiving donor or MBM 20 macie 24ml Q 3 hours; increasing 2 ml Q 12 hrs to a goal of 30 ml Q 3 hours. RN attempting po feedings as tolerated and remainder of feedings being administered via NG tube. Goal: Medications Outcome: Progressing Towards Goal 
Pt receiving Caffeine 15.2 mg po Q am and Vaseline as needed to prevent diaper rash. Pt also receiving Sucrose up to 2 ml po per procedure and/ or Q 8 hours administered as needed for comfort/ pain management. No further medications ordered at this time Goal: Treatments/Interventions/Procedures Outcome: Progressing Towards Goal 
Pt remains in 30.5- temperature > = 97.2 degrees and stable. Temperature to be weaned as tolerated per protocol. All further treatments/ interventions to be completed as tolerated per protocol. Goal: *Oxygen saturation within defined limits Outcome: Progressing Towards Goal 
O2 saturations within normal limits on room air. Goal: *Demonstrates behavior appropriate to gestational age Outcome: Progressing Towards Goal 
Pt demonstrates appropriate behavior according to gestational age. Goal: *Absence of infection signs and symptoms Outcome: Progressing Towards Goal 
 No signs of infection noted/ reported. Goal: *Family participates in care and asks appropriate questions Outcome: Progressing Towards Goal 
Parents visit at least one time per day and participate in pt care show appropriately. Parents also ask questions relevant to pt care/ current condition. Unable to come daily due to transportation prob and has other children Goal: *Labs within defined limits Outcome: Progressing Towards Goal 
No labs tonight Problem: NICU 32-33 weeks: Week 2 of Life (Days of Life 7-14) Goal: Activity/Safety Outcome: Progressing Towards Goal 
Pt identification band verified. Pt allowed adequate rest periods between care to promote growth. Velcro name band x 2 in place. Maternal prenatal history on chart.

## 2024-02-02 NOTE — ED PROVIDER NOTE - OBJECTIVE STATEMENT
8-year-old female with PMH asthma presenting for congestion, runny nose, subjective fever, chills, body aches since yesterday, worse today and now associated with mild chest tightness.  Denies SOB or chest pain.  Patient used her albuterol inhaler once or twice it had finished.  Denies nausea, vomiting, diarrhea, abdominal pain, dysuria.  Currently menstruating. Smokes marijuana daily but has not since symptoms began.

## 2024-02-02 NOTE — ED PROVIDER NOTE - PATIENT PORTAL LINK FT
You can access the FollowMyHealth Patient Portal offered by [facility] by registering at the following website: [email or web address]. By joining [employer]’s FollowMyHealth portal, you will also be able to view your health information using other applications (apps) compatible with our system.

## 2024-02-08 ENCOUNTER — EMERGENCY (EMERGENCY)
Facility: HOSPITAL | Age: 29
LOS: 0 days | Discharge: HOME | End: 2024-02-08
Attending: EMERGENCY MEDICINE
Payer: MEDICAID

## 2024-02-08 VITALS
SYSTOLIC BLOOD PRESSURE: 125 MMHG | OXYGEN SATURATION: 100 % | WEIGHT: 110.89 LBS | HEIGHT: 72 IN | TEMPERATURE: 98 F | DIASTOLIC BLOOD PRESSURE: 85 MMHG | RESPIRATION RATE: 18 BRPM | HEART RATE: 85 BPM

## 2024-02-08 DIAGNOSIS — J02.9 ACUTE PHARYNGITIS, UNSPECIFIED: ICD-10-CM

## 2024-02-08 DIAGNOSIS — J45.909 UNSPECIFIED ASTHMA, UNCOMPLICATED: ICD-10-CM

## 2024-02-08 PROCEDURE — 99283 EMERGENCY DEPT VISIT LOW MDM: CPT

## 2024-02-08 PROCEDURE — 99284 EMERGENCY DEPT VISIT MOD MDM: CPT

## 2024-02-08 RX ORDER — ALBUTEROL 90 UG/1
2 AEROSOL, METERED ORAL
Qty: 1 | Refills: 0
Start: 2024-02-08 | End: 2024-02-11

## 2024-02-08 RX ORDER — IBUPROFEN 200 MG
600 TABLET ORAL ONCE
Refills: 0 | Status: COMPLETED | OUTPATIENT
Start: 2024-02-08 | End: 2024-02-08

## 2024-02-08 RX ADMIN — Medication 600 MILLIGRAM(S): at 16:15

## 2024-02-08 NOTE — ED PROVIDER NOTE - PROGRESS NOTE DETAILS
BRITTNEY: Requesting refill of asthma pump, sent to pharmacy.  Patient states she does not have a PMD and is amenable to rapid referral for primary care.  Supportive care return precaution advised.  Patient comfortable plan    Patient to be discharged from ED. Any available test results were discussed with patient and/or family. Verbal instructions given, including instructions to return to ED immediately for any new, worsening, or concerning symptoms. Patient endorsed understanding. Written discharge instructions additionally given, including follow-up plan.

## 2024-02-08 NOTE — ED PROVIDER NOTE - PATIENT PORTAL LINK FT
You can access the FollowMyHealth Patient Portal offered by [facility] by registering at the following website: [email or web address]. By joining [employer]’s FollowMyHealth portal, you will also be able to view your health information using other applications (apps) compatible with our system.

## 2024-02-08 NOTE — ED PROVIDER NOTE - OBJECTIVE STATEMENT
28-year-old female with PMH of asthma, presents to ED for evaluation of sore throat x 4 days.  States over the last few days she has had subjective fever/chills, myalgias, sore throat, runny nose.  States all symptoms have mostly resolved except for her sore throat so came to ED for evaluation today.  Notes + postnasal drip.  Denies fever today, CP, SOB, abdominal pain, N/V/D.  Denies difficulty breathing or swallowing.  Reports normal appetite and p.o. intake.

## 2024-02-08 NOTE — ED PROVIDER NOTE - ATTENDING CONTRIBUTION TO CARE
Pts left knee wrapped with 2x4 guaze and coband dressing. 28yF asthma on albuterol prn p/w recent URI - chills, sore throat, cough that has improved except for ongoing sore throat.  Pt is tolerating PO w/o drooling, stridor, n/v but c/o pain w/ swallowing.  Did not take any meds for this but reports improvement with cough drops.

## 2024-02-08 NOTE — ED PROVIDER NOTE - CLINICAL SUMMARY MEDICAL DECISION MAKING FREE TEXT BOX
28yF mild intermittent (poorly controlled?) asthma p/w sore throat lingering after recent URI.  Pt well appearing, hemodynamically stable w/o resp distress or airway compromise.  Exam w/o concern for strep pharyngitis.  Suspect pain 2/2 post nasal drip.  Offered pt NSAIDs/supportive care, albuterol inhaler, PCP referral.  Ok to dc with return precautions.

## 2024-02-08 NOTE — ED PROVIDER NOTE - PHYSICAL EXAMINATION
VITAL SIGNS: I have reviewed nursing notes and confirm.  CONSTITUTIONAL: Well-developed; well-nourished; in no acute distress.  ENT: MMM.  OP clear, no erythema/exudates/tonsillar hypertrophy, uvula is midline, no pooling of secretions.  Normal voice.  NECK: Supple; non tender.  CARD: S1, S2 normal; no murmurs, gallops, or rubs. Regular rate and rhythm.  RESP: Normal respiratory effort, no tachypnea or distress. Lungs CTAB, no wheezes, rales or rhonchi.  LYMPH: No acute cervical adenopathy.  NEURO: Alert, oriented. Grossly unremarkable. No focal deficits.  PSYCH: Cooperative, appropriate.

## 2024-02-08 NOTE — ED PROVIDER NOTE - NSFOLLOWUPINSTRUCTIONS_ED_ALL_ED_FT
Our Emergency Department Referral Coordinators will be reaching out to you in the next 24-48 hours from 9:00am to 5:00pm with a follow up appointment. Please expect a phone call from the hospital in that time frame. If you do not receive a call or if you have any questions or concerns, you can reach them at   [phone]   --------------------------------------------    Viral Illness,  Viruses are tiny germs that can get into a person's body and cause illness. There are many different types of viruses, and they cause many types of illness. Viral illness in children is very common. Most viral illnesses that affect children are not serious. Most go away after several days without treatment.    common short-term conditions that are caused by a virus include:  Cold and flu (influenza) viruses.  Stomach viruses.  Viruses that cause fever and rash. These include illnesses such as measles, rubella, roseola, fifth disease, and chickenpox.  Long-term conditions that are caused by a virus include herpes, polio, and HIV (human immunodeficiency virus) infection. A few viruses have been linked to certain cancers.    What are the causes?  Many types of viruses can cause illness. Viruses invade cells in your child's body, multiply, and cause the infected cells to work abnormally or die. When these cells die, they release more of the virus. When this happens, develops symptoms of the illness, and the virus continues to spread to other cells. If the virus takes over the function of the cell, it can cause the cell to divide and grow out of control. This happens when a virus causes cancer.    Different viruses get into the body in different ways. Your child is most likely to get a virus from being exposed to another person who is infected with a virus. This may happen at home, at school, or at . Your child may get a virus by:  Breathing in droplets that have been coughed or sneezed into the air by an infected person. Cold and flu viruses, as well as viruses that cause fever and rash, are often spread through these droplets.  Touching anything that has the virus on it (is contaminated) and then touching his or her nose, mouth, or eyes. Objects can be contaminated with a virus if:  They have droplets on them from a recent cough or sneeze of an infected person.  They have been in contact with the vomit or stool (feces) of an infected person. Stomach viruses can spread through vomit or stool.  Eating or drinking anything that has been in contact with the virus.  Being bitten by an insect or animal that carries the virus.  Being exposed to blood or fluids that contain the virus, either through an open cut or during a transfusion.  What are the signs or symptoms?  Your child may have these symptoms, depending on the type of virus and the location of the cells that it invades:  Cold and flu viruses:  Fever.  Sore throat.  Muscle aches and headache.  Stuffy nose.  Earache.  Cough.  Stomach viruses:  Fever.  Loss of appetite.  Vomiting.  Stomachache.  Diarrhea.  Fever and rash viruses:  Fever.  Swollen glands.  Rash.  Runny nose.  How is this diagnosed?  This condition may be diagnosed based on one or more of the following:  Symptoms.  Medical history.  Physical exam.  Blood test, sample of mucus from the lungs (sputum sample), or a swab of body fluids or a skin sore (lesion).  How is this treated?  Most viral illnesses in children go away within 3–10 days. In most cases, treatment is not needed. Your child's health care provider may suggest over-the-counter medicines to relieve symptoms.    A viral illness cannot be treated with antibiotic medicines. Viruses live inside cells, and antibiotics do not get inside cells. Instead, antiviral medicines are sometimes used to treat viral illness, but these medicines are rarely needed in children.    Many childhood viral illnesses can be prevented with vaccinations (immunization shots). These shots help prevent the flu and many of the fever and rash viruses.    Follow these instructions at home:  Medicines    Give over-the-counter and prescription medicines only as told by your child's health care provider. Cold and flu medicines are usually not needed. If your child has a fever, ask the health care provider what over-the-counter medicine to use and what amount, or dose, to give.  Do not give your child aspirin because of the association with Reye's syndrome.  If your child is older than 4 years and has a cough or sore throat, ask the health care provider if you can give cough drops or a throat lozenge.  Do not ask for an antibiotic prescription if your child has been diagnosed with a viral illness. Antibiotics will not make your child's illness go away faster. Also, frequently taking antibiotics when they are not needed can lead to antibiotic resistance. When this develops, the medicine no longer works against the bacteria that it normally fights.  If your child was prescribed an antiviral medicine, give it as told by your child's health care provider. Do not stop giving the antiviral even if your child starts to feel better.  Eating and drinking      If your child is vomiting, give only sips of clear fluids. Offer sips of fluid often. Follow instructions from your child's health care provider about eating or drinking restrictions.  If your child can drink fluids, have the child drink enough fluids to keep his or her urine pale yellow.  General instructions    Make sure your child gets plenty of rest.  If your child has a stuffy nose, ask the health care provider if you can use saltwater nose drops or spray.  If your child has a cough, use a cool-mist humidifier in your child's room.  If your child is older than 1 year and has a cough, ask the health care provider if you can give teaspoons of honey and how often.  Keep your child home and rested until symptoms have cleared up. Have your child return to his or her normal activities as told by your child's health care provider. Ask your child's health care provider what activities are safe for your child.  Keep all follow-up visits as told by your child's health care provider. This is important.  How is this prevented?    To reduce your child's risk of viral illness:  Teach your child to wash his or her hands often with soap and water for at least 20 seconds. If soap and water are not available, he or she should use hand .  Teach your child to avoid touching his or her nose, eyes, and mouth, especially if the child has not washed his or her hands recently.  If anyone in your household has a viral infection, clean all household surfaces that may have been in contact with the virus. Use soap and hot water. You may also use bleach that you have added water to (diluted).  Keep your child away from people who are sick with symptoms of a viral infection.  Teach your child to not share items such as toothbrushes and water bottles with other people.  Keep all of your child's immunizations up to date.  Have your child eat a healthy diet and get plenty of rest.  Contact a health care provider if:  Your child has symptoms of a viral illness for longer than expected. Ask the health care provider how long symptoms should last.  Treatment at home is not controlling your child's symptoms or they are getting worse.  Your child has vomiting that lasts longer than 24 hours.  Get help right away if:  Your child who is younger than 3 months has a temperature of 100.4°F (38°C) or higher.  Your child who is 3 months to 3 years old has a temperature of 102.2°F (39°C) or higher.  Your child has trouble breathing.  Your child has a severe headache or a stiff neck.  These symptoms may represent a serious problem that is an emergency. Do not wait to see if the symptoms will go away. Get medical help right away. Call your local emergency services (911 in the [place]    Summary  Viruses are tiny germs that can get into a person's body and cause illness.  Most viral illnesses that affect children are not serious. Most go away after several days without treatment.  Symptoms may include fever, sore throat, cough, diarrhea, or rash.  Give over-the-counter and prescription medicines only as told by your child's health care provider. Cold and flu medicines are usually not needed. If your child has a fever, ask the health care provider what over-the-counter medicine to use and what amount to give.  Contact a health care provider if your child has symptoms of a viral illness for longer than expected. Ask the health care provider how long symptoms should last.  This information is not intended to replace advice given to you by your health care provider. Make sure you discuss any questions you have with your health care provider.

## 2024-03-08 NOTE — ED PROVIDER NOTE - NSDCPRINTRESULTS_ED_ALL_ED
CHIEF COMPLAINT:  Chronic low back pain, LE pain    HISTORY OF PRESENT ILLNESS:  The patient has significant low back pain. The patient is recommended to have lumbar interventional pain procedures. [facility]      PAST MEDICAL HISTORY:  reviewed and documented in chart.      SOCIAL HISTORY:  reviewed and documented in chart.      ALLERGIES:  reviewed and documented in chart.      CURRENT MEDICATIONS:  reviewed and documented in chart.      FAMILY HISTORY:  reviewed and documented in chart.      REVIEW OF SYSTEMS:  A 14-system review was negative except for problem mentioned above.      PHYSICAL EXAM:  I have personally reviewed patient OR holding area vital signs.   The patient is awake and alert.  Patient is oriented and has no acute distress.  Patient’s mood is stable with normal affect.    HEEN: eyes, nose and ears are without discharge.    NECK: supple.    LUNGS: clear to auscultation.    HEART:  regular rate and rhythm.    ABDOMEN: soft and nontender, nondistended.  Bowel sounds are present.    LUMBAR SPINE/EXTREMITIES: Tenderness.      ASSESSMENT:  lumbar Radiculitis    PLAN:  1. Right L4-5 TF SHARA.  Risks, benefits and alternative were explained, patient agreed.  2. Re-evaluation in two weeks in clinic    
Patient requests all Lab, Cardiology, and Radiology Results on their Discharge Instructions

## 2024-05-22 ENCOUNTER — EMERGENCY (EMERGENCY)
Facility: HOSPITAL | Age: 29
LOS: 0 days | Discharge: HOME | End: 2024-05-22
Attending: EMERGENCY MEDICINE
Payer: MEDICAID

## 2024-05-22 VITALS
SYSTOLIC BLOOD PRESSURE: 126 MMHG | HEART RATE: 84 BPM | TEMPERATURE: 100 F | RESPIRATION RATE: 18 BRPM | OXYGEN SATURATION: 99 % | WEIGHT: 126.1 LBS | HEIGHT: 72 IN | DIASTOLIC BLOOD PRESSURE: 85 MMHG

## 2024-05-22 DIAGNOSIS — R50.9 FEVER, UNSPECIFIED: ICD-10-CM

## 2024-05-22 DIAGNOSIS — J06.9 ACUTE UPPER RESPIRATORY INFECTION, UNSPECIFIED: ICD-10-CM

## 2024-05-22 DIAGNOSIS — Z20.822 CONTACT WITH AND (SUSPECTED) EXPOSURE TO COVID-19: ICD-10-CM

## 2024-05-22 DIAGNOSIS — R05.1 ACUTE COUGH: ICD-10-CM

## 2024-05-22 DIAGNOSIS — R09.81 NASAL CONGESTION: ICD-10-CM

## 2024-05-22 DIAGNOSIS — J45.909 UNSPECIFIED ASTHMA, UNCOMPLICATED: ICD-10-CM

## 2024-05-22 LAB
FLUAV AG NPH QL: DETECTED
FLUBV AG NPH QL: SIGNIFICANT CHANGE UP
RSV RNA NPH QL NAA+NON-PROBE: SIGNIFICANT CHANGE UP
SARS-COV-2 RNA SPEC QL NAA+PROBE: SIGNIFICANT CHANGE UP

## 2024-05-22 PROCEDURE — 94640 AIRWAY INHALATION TREATMENT: CPT

## 2024-05-22 PROCEDURE — 99283 EMERGENCY DEPT VISIT LOW MDM: CPT | Mod: 25

## 2024-05-22 PROCEDURE — 71046 X-RAY EXAM CHEST 2 VIEWS: CPT | Mod: 26

## 2024-05-22 PROCEDURE — 0241U: CPT

## 2024-05-22 PROCEDURE — 99284 EMERGENCY DEPT VISIT MOD MDM: CPT

## 2024-05-22 PROCEDURE — 71046 X-RAY EXAM CHEST 2 VIEWS: CPT

## 2024-05-22 RX ORDER — ALBUTEROL 90 UG/1
1 AEROSOL, METERED ORAL ONCE
Refills: 0 | Status: COMPLETED | OUTPATIENT
Start: 2024-05-22 | End: 2024-05-22

## 2024-05-22 RX ORDER — IBUPROFEN 200 MG
600 TABLET ORAL ONCE
Refills: 0 | Status: COMPLETED | OUTPATIENT
Start: 2024-05-22 | End: 2024-05-22

## 2024-05-22 RX ADMIN — Medication 600 MILLIGRAM(S): at 13:49

## 2024-05-22 RX ADMIN — ALBUTEROL 1 PUFF(S): 90 AEROSOL, METERED ORAL at 13:49

## 2024-05-22 NOTE — ED PROVIDER NOTE - OBJECTIVE STATEMENT
29yo female with PMHx asthma presents c/o dry cough, subjective fever, chills, nasal congestion x 1 week. Pt reports laying flat induces coughing and wheezing and she has since run out of her inhaler. 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 She denies SOB, chest pain.

## 2024-05-22 NOTE — ED ADULT NURSE NOTE - SUICIDE SCREENING QUESTION 3
Chana Shah is a 61 year old female presents today for   Chief Complaint   Patient presents with   • Other     Entered by patient   • Vaginal Discharge     X3 days     Chana Shah is a 61 year old female presenting with vaginal discharge for the past 3 days. She also has left-sided \"pelvic pain\" and felt flushed, headache, and chills today.       \"She is postmenopausal and has had little to no vaginal discharge for years. This discharge is enough that she occasional feels little \"squirts\" of discharge coming out. The discharge is clear when wiping, but light yellow on pad. The is a change to the odor but not necessarily fishy or foul. Denies itching.     \"Pt states it's \"almost like a cyst broke\", but that it is not infectious looking material. 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 Pt states it's about a \"tablespoon every 6-8 hours\" and that it is not related to trauma or anything else.\"    Patient denies fever, cold symptoms, bloody discharge,  complaints, and sexual intercourse in the past 10 days.  She has tried APAP & IBF for symptoms, last dose 1400. She has an appt with OBGYN on Monday, but feels worse as the day goes on.       ALLERGIES:   Allergen Reactions   • Adhesive   (Environmental) ERYTHEMA   • Sulfa Antibiotics HIVES and RASH       /70   Pulse 76   Temp 99.2 °F (37.3 °C) (Oral)   Resp 14   Ht 5' 9\" (1.753 m)   Wt 66.5 kg (146 lb 8 oz)   LMP 02/01/2013   Ob/Gyn Status: Postmenopausal  Medications: medications verified, no change      PCP: Alyson Erickson, PA    Patient here alone    Patient would like communication of their results via:      Cell Phone:   Telephone Information:   Mobile [phone]     Okay to leave a message containing results? Yes       No

## 2024-05-22 NOTE — ED PROVIDER NOTE - CLINICAL SUMMARY MEDICAL DECISION MAKING FREE TEXT BOX
28-year-old female with viral URI with cough.  Vital signs reviewed and are normal.  Patient appears very well, chest x-ray film was independently interpreted by me, ED attending Dr Chantell Lockett as negative for acute pulmonary process.  She was swabbed, given neb, reported feeling much better, stable for DC home.  Advised to follow-up with PCP if neeed,  strict return precautions given.  She was given the patient has questions, verbalized understanding and is amenable to DC plan.

## 2024-05-22 NOTE — ED PROVIDER NOTE - CARE PROVIDER_API CALL
Reza Mason E  Pulmonary Disease  [address] [place] [id number]  Phone: [phone]  Fax: [phone]  Follow Up Time:

## 2024-05-22 NOTE — ED PROVIDER NOTE - ATTENDING APP SHARED VISIT CONTRIBUTION OF CARE
28-year-old female past med history of asthma presenting for evaluation nasal congestion, cough, body aches for the past 3 days. Denies additional complaints.  Well-appearing female in no distress, PERRL, MMM, speaking full sentences, lungs clear to auscultation, equal air entry, RRR, abdomen soft/NT/ND, awake and alert x 3, no gross neurodeficits, normal mood and affect.

## 2024-05-22 NOTE — ED ADULT TRIAGE NOTE - ACCOMPANIED BY
From: Julián Stark  To:  Aylin Kauffman MD  Sent: 5/26/2017 1:49 PM EDT  Subject: Medication Renewal Request    Original authorizing provider: MD Julián Walker would like a refill of the following medications:  tadalafil (CIALIS) 10 mg tablet Aylin Kauffman MD]    Preferred pharmacy: [id number] - [address], [address] AT [facility]: Self

## 2024-05-22 NOTE — ED PROVIDER NOTE - PATIENT PORTAL LINK FT
You can access the FollowMyHealth Patient Portal offered by [facility] by registering at the following website: [email or web address]. By joining [facility]’s FollowMyHealth portal, you will also be able to view your health information using other applications (apps) compatible with our system.

## 2024-05-22 NOTE — ED PROVIDER NOTE - NSFOLLOWUPINSTRUCTIONS_ED_ALL_ED_FT
Our Emergency Department Referral Coordinators will be reaching out to you in the next 24-48 hours from 9:00am to 5:00pm to schedule a follow up appointment. Please expect a phone call from the hospital in that time frame. If you do not receive a call or if you have any questions or concerns, you can reach them at   [phone] [place] [address].        UPPER RESPIRATORY INFECTION - AfterCare(R) Instructions(ER/ED)    Upper Respiratory Infection    WHAT YOU NEED TO KNOW:    An upper respiratory infection is also called a cold. It can affect your nose, throat, ears, and sinuses. Cold symptoms are usually worst for the first 3 to 5 days. Most people get better in 7 to 14 days. You may continue to cough for 2 to 3 weeks. Colds are caused by viruses and do not get better with antibiotics.    DISCHARGE INSTRUCTIONS:    Call your local emergency number (911 in the ) if:    You have chest pain or trouble breathing.    Return to the emergency department if:    You have a fever over 102ºF (39ºC).    Call your doctor if:    You have a low fever.    Your sore throat gets worse or you see white or yellow spots in your throat.    Your symptoms get worse after 3 to 5 days or are not better in 14 days.    You have a rash anywhere on your skin.    You have large, tender lumps in your neck.    You have thick, green, or yellow drainage from your nose.    You cough up thick yellow, green, or bloody mucus.    You have a bad earache.    You have questions or concerns about your condition or care.  Medicines: You may need any of the following:    Decongestants help reduce nasal congestion and help you breathe more easily. If you take decongestant pills, they may make you feel restless or cause problems with your sleep. Do not use decongestant sprays for more than a few days.    Cough suppressants help reduce coughing. Ask your healthcare provider which type of cough medicine is best for you.    NSAIDs, such as ibuprofen, help decrease swelling, pain, and fever. NSAIDs can cause stomach bleeding or kidney problems in certain people. If you take blood thinner medicine, always ask your healthcare provider if NSAIDs are safe for you. Always read the medicine label and follow directions.    Acetaminophen decreases pain and fever. It is available without a doctor's order. Ask how much to take and how often to take it. Follow directions. Read the labels of all other medicines you are using to see if they also contain acetaminophen, or ask your doctor or pharmacist. Acetaminophen can cause liver damage if not taken correctly.    Take your medicine as directed. Contact your healthcare provider if you think your medicine is not helping or if you have side effects. Tell your provider if you are allergic to any medicine. Keep a list of the medicines, vitamins, and herbs you take. Include the amounts, and when and why you take them. Bring the list or the pill bottles to follow-up visits. Carry your medicine list with you in case of an emergency.  Self-care:    Rest as much as possible. Slowly start to do more each day.    Drink more liquids as directed. Liquids will help thin and loosen mucus so you can cough it up. Liquids will also help prevent dehydration. Liquids that help prevent dehydration include water, fruit juice, and broth. Do not drink liquids that contain caffeine. Caffeine can increase your risk for dehydration. Ask your healthcare provider how much liquid to drink each day.    Soothe a sore throat. Gargle with warm salt water. Make salt water by dissolving ¼ teaspoon salt in 1 cup warm water. You may also suck on hard candy or throat lozenges. You may use a sore throat spray.    Use a humidifier or vaporizer. Use a cool mist humidifier or a vaporizer to increase air moisture in your home. This may make it easier for you to breathe and help decrease your cough.  Humidifier      Use saline nasal drops as directed. These help relieve congestion.    Apply petroleum-based jelly around the outside of your nostrils. This can decrease irritation from blowing your nose.    Do not smoke. Nicotine and other chemicals in cigarettes and cigars can make your symptoms worse. They can also cause infections such as bronchitis or pneumonia. Ask your healthcare provider for information if you currently smoke and need help to quit. E-cigarettes or smokeless tobacco still contain nicotine. Talk to your healthcare provider before you use these products.  Prevent a cold:    Wash your hands often. Use soap and water every time you wash your hands. Rub your soapy hands together, lacing your fingers. Use the fingers of one hand to scrub under the nails of the other hand. Wash for at least 20 seconds. Rinse with warm, running water for several seconds. Then dry your hands. Use hand  gel if soap and water are not available. Do not touch your eyes or mouth without washing your hands first.  Handwashing      Cover a sneeze or cough. Use a tissue that covers your mouth and nose. Put the used tissue in the trash right away. Use the bend of your arm if a tissue is not available. Wash your hands well with soap and water or use a hand . Do not stand close to anyone who is sneezing or coughing.    Try to stay away from others while you are sick. This is especially important during the first 2 to 3 days when the virus is more easily spread. Wait until a fever, cough, or other symptoms are gone before you return to work or other regular activities.    Do not share items while you are sick. This includes food, drinks, eating utensils, and dishes.  Follow up with your doctor as directed: Write down your questions so you remember to ask them during your visits.

## 2024-05-22 NOTE — ED PROVIDER NOTE - PHYSICAL EXAMINATION
CONST: Well appearing in NAD  EYES: PERRL, EOMI, Sclera and conjunctiva clear.   ENT: Rhinorrhea clear. TM's clear B/L without drainage. Oropharynx normal appearing, no erythema or exudates. Uvula midline.  CARD: Normal S1 S2; Normal rate and rhythm  RESP: Equal BS B/L, No wheezes, rhonchi or rales. No distress  GI: Soft, non-tender, non-distended.  SKIN: Warm, dry, no acute rashes. Good turgor

## 2024-09-11 NOTE — ED PROVIDER NOTE - NS ED ATTENDING STATEMENT MOD
Patient : Silvano Jackson Age: 66 year old Sex: male   MRN: [id number] Encounter Date: 9/11/2024    History     Chief Complaint   Patient presents with    Shoulder Pain     HPI  9/11/2024  3:04 PM Silvano Jackson is a 66 year old male who presents to the ED for evaluation of left shoulder pain that he woke up with at 5:00 AM. Patient with increased pain to the left AC joint. The patient also reports no specific injury, but notes he was fixing his truck yesterday, carrying heavy bucket full of tools around. He notes pain with movement. He reports taking ibuprofen this morning for pain. Pt denies chest pain, shortness of breath or any other associated sx.    PCP: Ceci Michael MD      Past/Family/Social History     No Known Allergies    Discharge Medication List as of 9/11/2024  4:43 PM        Prior to Admission Medications    Details   atorvastatin (LIPITOR) 20 MG tablet Take 1 tablet by mouth daily.Eprescribe, Disp-90 tablet, R-1      canagliflozin (Invokana) 100 MG tablet Take 1 tablet by mouth daily (before breakfast).Eprescribe, Disp-90 tablet, R-1      levothyroxine 25 MCG tablet Take 1 tablet by mouth daily.Eprescribe, Disp-90 tablet, R-3**Patient requests 90 days supply**      lisinopril (ZESTRIL) 10 MG tablet Take 1 tablet by mouth daily.Eprescribe, Disp-90 tablet, R-3      metFORMIN (GLUCOPHAGE) 500 MG tablet Take 1 tablet by mouth 2 times daily (with meals).Eprescribe, Disp-180 tablet, R-3      camphor-menthol (Sarna) 0.5-0.5 % lotion Apply topically twice daily as needed for itching on the trunk and extremitiesDisp-222 mL, R-11, Eprescribe      aspirin (Aspirin Low Dose) 81 MG EC tablet Take 1 tablet by mouth daily.Eprescribe, Disp-90 tablet, R-1      blood glucose test strip Test blood sugar 3 times daily as directed. Diagnosis: type 2 diabetes Meter: one touch verioDisp-200 strip, R-3, Eprescribe           New Prescriptions    Details   traMADol (ULTRAM) 50 MG tablet Indication: Acute PainDelegates  - In compliance with state law, the Prescription Drug Monitoring Program must be reviewed prior to signing any order for a controlled substance. PDMP Reviewed by Delegate - No Unexpected ActivityTake 1 tablet by mouth ev robinson 6 hours as needed for Pain.Eprescribe, Disp-10 tablet, R-0      diclofenac (VOLTAREN) 1 % gel Apply 2 g topically 4 times daily as needed (pain of shoulder).Eprescribe, Topical, 4 TIMES DAILY PRN, Disp-50 g, R-0Osteoarthritis:   - Lower extremity (eg, knee): Apply 4 g to each affected area up to 4 times daily; max dose per joint: 16 g/day    -  Upper extremity (eg, hand): Apply 2 g to each affected area up to 4 times daily; max dose per joint: 8 g/day   - Max total body dose (all combined joints): 32 g/day     Pain, acute (eg, musculoskeletal):    - (off-label use) Apply up to 4 g to each affec delfin area up to 4 times daily; max dose per area: 16 g/day   - Max total body dose (all combined areas): 32 g/day.    - NOTE: Dosing is based on general recommendations from  labeling.                   Past Medical History:   Diagnosis Date    Diabetes  (CMD)     Essential (primary) hypertension     Fracture        Past Surgical History:   Procedure Laterality Date    Cologuard  03/01/2018    Fracture surgery         Family History   Problem Relation Age of Onset    Diabetes Mother        Social History     Tobacco Use    Smoking status: Never    Smokeless tobacco: Never   Vaping Use    Vaping status: never used   Substance Use Topics    Alcohol use: Yes     Alcohol/week: 1.0 - 2.0 standard drink of alcohol     Types: 1 - 2 Cans of beer per week     Comment: 1 time per week    Drug use: No          Review of Systems   Review of Symptoms     Review of Systems   Constitutional:  Negative for chills, diaphoresis and fever.   HENT:  Negative for congestion, rhinorrhea and sore throat.    Eyes:  Negative for visual disturbance.   Respiratory:  Negative for cough and shortness of breath.     Cardiovascular:  Negative for chest pain.   Gastrointestinal:  Negative for abdominal pain, blood in stool, constipation, diarrhea, nausea, rectal pain and vomiting.   Genitourinary:  Negative for dysuria, frequency and hematuria.   Musculoskeletal:  Positive for arthralgias (left AC joint of shoulder). Negative for back pain.   Skin:  Negative for color change and rash.   Neurological:  Negative for weakness and light-headedness.   Psychiatric/Behavioral:  Negative for confusion.           Physical Exam   Physical Exam   ED Triage Vitals [09/11/24 1331]   ED Triage Vitals Group      Temp 98.4 °F (36.9 °C)      Heart Rate 99      Resp 16      BP (!) 168/98      SpO2 98 %      EtCO2 mmHg       Height       Weight 134 lb 14.7 oz (61.2 kg)      Weight Scale Used       BMI (Calculated)       IBW/kg (Calculated)        Physical Exam  Vitals reviewed.   Constitutional:       General: He is not in acute distress.     Appearance: Normal appearance. He is not ill-appearing, toxic-appearing or diaphoretic.   HENT:      Head: Normocephalic and atraumatic.      Right Ear: External ear normal.      Left Ear: External ear normal.      Nose: Nose normal.   Eyes:      Conjunctiva/sclera: Conjunctivae normal.   Cardiovascular:      Rate and Rhythm: Normal rate.      Pulses: Normal pulses.           Radial pulses are 2+ on the right side and 2+ on the left side.   Pulmonary:      Effort: Pulmonary effort is normal.   Musculoskeletal:         General: Normal range of motion.      Left shoulder: Tenderness (most significantly at left AC joint) present. No swelling. Normal range of motion (full passive ROM but pain with abduction).      Cervical back: Normal range of motion.      Comments: No warmth or erythema to left shoulder.  5/5 Strength BUE, sensation intact   Skin:     General: Skin is warm and dry.      Capillary Refill: Capillary refill takes less than 2 seconds.   Neurological:      General: No focal deficit present.       Mental Status: He is alert and oriented to person, place, and time.   Psychiatric:         Mood and Affect: Mood normal.         Behavior: Behavior normal.            Procedures   ED Procedures     Procedures       Lab Results   ED Lab   No results found for this visit on 09/11/24.       EKG               Radiology Results   ED Radiology Results     Imaging Results              XR SHOULDER 3 VIEWS LEFT (Final result)  Result time 09/11/24 15:47:10      Final result                   Impression:    IMPRESSION:    No acute osseous findings.     I, Attending Radiologist Alisha Mott MD, have reviewed the images and  report and concur with these findings interpreted by Resident Radiologist,  Dagoberto Moise DO.    Electronically Signed by: Alisha Mott MD  Signed on: 9/11/2024 3:47 PM  Created on Workstation ID: [id number]  Signed on Workstation ID: [id number]               Narrative:    XR SHOULDER 3 VIEWS LEFT     HISTORY: Per patient had been working on truck lifting somewhat heavy parts  yesterday. Reports previous dislocation when he was younger.    COMPARISON: None.    TECHNIQUE: 3 views of the left shoulder.    FINDINGS:    No fracture. The humeral head is appropriately positioned in the glenoid.  No widening of the acromioclavicular joint. No significant degenerative  changes.                        Preliminary result                   Impression:    IMPRESSION:    No acute osseous findings.     * * * * * * * * * * * * * * PRELIMINARY REPORT * * * * * * * * * * * * * *                    Narrative:        * * * * * * * * * * * * * * PRELIMINARY REPORT * * * * * * * * * * * * * *     XR SHOULDER 3 VIEWS LEFT     HISTORY: Per patient had been working on truck lifting somewhat heavy parts  yesterday. Reports previous dislocation when he was younger.  COMPARISON: None.    TECHNIQUE: 3 views of the left shoulder.    FINDINGS:    No fracture. The humeral head is appropriately positioned in the glenoid.  No widening  of the acromioclavicular joint. No significant degenerative  changes.                                         ED Medications   ED Medications     Medications   HYDROcodone-acetaminophen (NORCO) 5-325 MG per tablet 1 tablet (1 tablet Oral Given 9/11/24 1537)          ED Course     ED Course as of 09/12/24 1158   Wed Sep 11, 2024   1529 I have independently interpreted the Xray of the Left shoulder and have found No Fracture. I am awaiting on the final radiology read.     []   1638 I rechecked the pt who reports pain improved. I updated the pt on the imaging results. We discussed the plan of care. I will prescribe tramadol. I recommended that the pt f/u with ortho. I advised the pt to return to the ED for any new or worsening sx. The pt understands and agrees with the plan. All questions answered.   [MC]      ED Course User Index  [MC] Tami Shirley PA-C       Independent Review Completed in ED course    MDM      Patient is a 66 year old with complaint of L shoulder pain.  My differential diagnosis includes but is not limited to muscle strain, arthritis, ligament strain, septic arthritis, ACS vs other. Pt hypertensive, vitals otherwise within normal limits. Pt has tenderness to L shoulder, most significantly at the AC joint. Neurovascularly intact. Pain with ROM. NO erythema, warmth, fevers; low concern for septic arthritis. He refused ECG. This is very likely musculoskeletal based on history and exam; but still recommended ECG due to concern for cardiac etiology but he still refused. Obtained xray which showed no fracture, dislocation or acute findings. Pt improved with Banquete. He requested sling. Likely strain of AC joint or muscle. He is appropriate for discharge and follow up with ortho. Pt understood and agreed with plan. All questions were answered.     The patient will not require admission.     Disposition       Clinical Impression and Diagnosis  4:44 PM       ED Diagnosis       Diagnosis Comment  Associated Orders       Final diagnosis    Acute pain of left shoulder -- TRAMADOL HCL 50 MG PO TABS              Follow Up:  [facility]  [address] [address]  [phone]    If symptoms worsen    Dagoberto Taylor MD  [address] [facility] [address]  [phone]    Schedule an appointment as soon as possible for a visit       [facility]  [phone]  Call             Summary of your Discharge Medications        Take these Medications        Details   diclofenac 1 % gel  Commonly known as: VOLTAREN   Apply 2 g topically 4 times daily as needed (pain of shoulder).     traMADol 50 MG tablet  Commonly known as: ULTRAM   Take 1 tablet by mouth every 6 hours as needed for Pain.              Pt is discharged to home/self care in stable condition.        Discharge after Treatment 9/11/2024  4:42 PM  There is no comment      I have reviewed the information recorded by the scribe for accuracy and agree with its contents.    ____________________________________________________________________    Daniel Ray acting as a scribe for Tami Shirley PA-C.    Tami Shirley PA-C  Dictation # [id number]  Scribe: Daniel Ray    Attending Physician: Dr. Jarocho Bravo  Dictation # [id number]     Tami Shirley PA-C  09/12/24 5696     This was a shared visit with the AILYN. I reviewed and verified the documentation.

## 2024-11-18 NOTE — ED PROVIDER NOTE - CCCP TRG CHIEF CMPLNT
medical evaluation
[email or web address]

## 2025-01-26 ENCOUNTER — EMERGENCY (EMERGENCY)
Facility: HOSPITAL | Age: 30
LOS: 0 days | Discharge: HOME | End: 2025-01-26
Attending: STUDENT IN AN ORGANIZED HEALTH CARE EDUCATION/TRAINING PROGRAM
Payer: MEDICAID

## 2025-01-26 VITALS
RESPIRATION RATE: 18 BRPM | SYSTOLIC BLOOD PRESSURE: 131 MMHG | DIASTOLIC BLOOD PRESSURE: 81 MMHG | WEIGHT: 130.95 LBS | OXYGEN SATURATION: 100 % | TEMPERATURE: 98 F | HEART RATE: 107 BPM

## 2025-01-26 VITALS — HEART RATE: 72 BPM | RESPIRATION RATE: 18 BRPM | OXYGEN SATURATION: 100 %

## 2025-01-26 DIAGNOSIS — K08.89 OTHER SPECIFIED DISORDERS OF TEETH AND SUPPORTING STRUCTURES: ICD-10-CM

## 2025-01-26 PROCEDURE — 96372 THER/PROPH/DIAG INJ SC/IM: CPT

## 2025-01-26 PROCEDURE — 99283 EMERGENCY DEPT VISIT LOW MDM: CPT | Mod: 25

## 2025-01-26 PROCEDURE — 99283 EMERGENCY DEPT VISIT LOW MDM: CPT

## 2025-01-26 RX ORDER — CHLORHEXIDINE GLUCONATE 1.2 MG/ML
15 RINSE ORAL
Qty: 1 | Refills: 0
Start: 2025-01-26 | End: 2025-02-01

## 2025-01-26 RX ORDER — KETOROLAC TROMETHAMINE 30 MG/ML
30 INJECTION INTRAMUSCULAR; INTRAVENOUS ONCE
Refills: 0 | Status: DISCONTINUED | OUTPATIENT
Start: 2025-01-26 | End: 2025-01-26

## 2025-01-26 RX ORDER — DIPHENHYDRAMINE HYDROCHLORIDE AND LIDOCAINE HYDROCHLORIDE AND ALUMINUM HYDROXIDE AND MAGNESIUM HYDRO
30 KIT ONCE
Refills: 0 | Status: COMPLETED | OUTPATIENT
Start: 2025-01-26 | End: 2025-01-26

## 2025-01-26 RX ADMIN — DIPHENHYDRAMINE HYDROCHLORIDE AND LIDOCAINE HYDROCHLORIDE AND ALUMINUM HYDROXIDE AND MAGNESIUM HYDRO 30 MILLILITER(S): KIT at 21:31

## 2025-01-26 RX ADMIN — KETOROLAC TROMETHAMINE 30 MILLIGRAM(S): 30 INJECTION INTRAMUSCULAR; INTRAVENOUS at 21:31

## 2025-01-26 NOTE — ED PROVIDER NOTE - NSTIMEPROVIDERCAREINITIATE_GEN_ER
Prior Authorization Retail Medication Request    Medication/Dose: Banzel 40mg/ml  ICD code (if different than what is on RX):  E80415  Previously Tried and Failed:  Unknown  Rationale:  Prior Auth     [place]  Insurance ID:  [id number]  [phone]      Pharmacy Information (if different than what is on RX)  Name:  [facility]  Phone:  [phone]  Thank You,  Sandy Johnson [facility]       26-Jan-2025 20:35

## 2025-01-26 NOTE — ED PROVIDER NOTE - PATIENT PORTAL LINK FT
Single
You can access the FollowMyHealth Patient Portal offered by [facility] by registering at the following website: [email or web address]. By joining [employer]’s FollowMyHealth portal, you will also be able to view your health information using other applications (apps) compatible with our system.

## 2025-01-26 NOTE — ED ADULT TRIAGE NOTE - CHIEF COMPLAINT QUOTE
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

## 2025-01-26 NOTE — ED PROVIDER NOTE - OBJECTIVE STATEMENT
29 year old F with hx of pica c/o upper gum pain. Pt sts when she was chewing on ice 1 day ago felt sharp pain to b/l upper gums. Since than has had when trying to eat. Denies any other trauma/injuries, difficulty tolerating oral secretions, fever/chills, voice changes or hoarseness.

## 2025-01-26 NOTE — ED PROVIDER NOTE - ATTENDING APP SHARED VISIT CONTRIBUTION OF CARE
I saw and evaluated the patient independently and the note reflects the combined entries of the resident/PA and myself as the attending physician.  I have made corrections and additions to the documentation as needed.  Please see my MDM for further details.

## 2025-01-26 NOTE — ED PROVIDER NOTE - CLINICAL SUMMARY MEDICAL DECISION MAKING FREE TEXT BOX
patient has pica. multiple sores in her mouth from truama from eating ice. overall poor dentitiation with plaque buildup. no notable gingival abscess. pt counseled about otc gels to use for pain. also prescribes peridex. referral for patient has pica. multiple sores in her mouth from trauma from eating ice. overall poor dentition with plaque buildup. no notable gingival abscess. pt counseled about otc gels to use for pain. also prescribe peridex. referral for dentist and pcp for pics and gum disease

## 2025-01-26 NOTE — ED PROVIDER NOTE - NSFOLLOWUPCLINICS_GEN_ALL_ED_FT
[facility]  Dental  [address] [place] [id number]  Phone: [phone]  Fax:

## 2025-01-26 NOTE — ED PROVIDER NOTE - PHYSICAL EXAMINATION
CONSTITUTIONAL: Well-appearing; well-nourished; in no apparent distress.   EYES: PERRL; EOM intact.   ENT: +poor dentition and gingivitis noted. No abscess seen. normal nose; no rhinorrhea; normal pharynx with no tonsillar hypertrophy.   NECK: Supple; non-tender; no cervical lymphadenopathy.   CARDIOVASCULAR: Normal S1, S2; no murmurs, rubs, or gallops.   RESPIRATORY: Normal chest excursion with respiration; breath sounds clear and equal bilaterally; no wheezes, rhonchi, or rales.  MS: No evidence of trauma or deformity. Normal ROM in all four extremities; non-tender to palpation; distal pulses are normal.   SKIN: Normal for age and race; warm; dry; good turgor; no apparent lesions or exudate.   NEURO/PSYCH: A & O x 4; grossly unremarkable. mood and manner are appropriate. Grooming and personal hygiene are appropriate. No apparent thoughts of harm to self or others.

## 2025-01-26 NOTE — ED PROVIDER NOTE - NSFOLLOWUPINSTRUCTIONS_ED_ALL_ED_FT
Gingivitis  Gingivitis is inflammation of the gums. It can cause redness, soreness, bleeding, and swelling of the gums. This condition is usually mild and clears up with improved home care or treatment from a dental care provider. If left untreated, gingivitis can get worse and lead to other problems with the teeth and gums.    What are the causes?  This condition is usually caused by a buildup of a sticky substance called plaque.  Plaque is made up of mucus, bacteria, and food particles.  When plaque builds up, it reacts with the saliva in the mouth to form a hard deposit called tartar or calculus, which becomes trapped around the base of the tooth.  Plaque and tartar cause irritation of the gums, as well as a breeding ground for bacteria, that leads to gingivitis.  Gingivitis usually results from poor home care and not having regular dental cleaning of the mouth and teeth (oral hygiene).    What increases the risk?  The following factors may make you more likely to develop this condition:  Not practicing proper oral hygiene.  Eating a diet that does not provide proper nutrition.  Taking certain medicines.  Being pregnant.  Going through puberty or menopause.  Using products that contain nicotine and tobacco.  Having certain medical conditions, such as:  Diabetes.  Some viral or fungal infections.  Dry mouth.  Wearing dental appliances that do not fit properly.  What are the signs or symptoms?  Normal teeth and gums compared with teeth and gums that show plaque and gingivitis.   Symptoms of this condition include:  Gums that bleed easily, especially during flossing or brushing.  Swollen gums.  Gums that are bright red or purple.  Receding gums. This means that the gums are wearing away from the teeth so that more of the tooth is exposed.  Bad breath.  How is this diagnosed?  This condition is diagnosed with a medical history and an examination of the teeth and gums. You will also need X-rays to see if the inflammation has spread to the supporting structures of the teeth.    How is this treated?  This condition may be treated by:  Having your teeth and gums cleaned at a dental care provider's office to have the plaque and tartar removed.  Practicing good oral hygiene at home. This includes careful brushing and regular flossing.  Using a mouthwash. In some cases, a mouthwash may be prescribed or recommended.  In more advanced cases, this condition may be treated with antibiotic medicine or surgery.    Follow these instructions at home:  Using a toothbrush to brush the front and back sides of the teeth.  How to floss your teeth, with close-up showing that a person should floss the sides of each tooth.  Follow instructions from your dental care provider about how to clean your teeth. Make sure you:  Brush your teeth twice a day using a soft-bristled toothbrush.  Floss at least one time each day. It is best to floss before you brush your teeth.  Use a mouthwash as recommended by your dental care provider  Eat a well-balanced diet. Limit your intake of foods and beverages that contain sugar between meals.  See a dental care provider on a regular basis for cleaning and checkups.  Do not use any products that contain nicotine or tobacco, such as cigarettes, e-cigarettes, and chewing tobacco. If you need help quitting, ask your dental care provider.  If you were prescribed an antibiotic medicine, take it as told by your dental care provider. Do not stop using the antibiotic even if you start to feel better.  Keep all follow-up visits. This is important.  Contact a health care provider if:  You have a fever.  You have a lot of bleeding from your gums.  You have pain in your gums or teeth.  You have difficulty chewing.  You have any loose or infected teeth.  You have swollen glands in your face or neck.  Summary  Gingivitis is inflammation of the gums. It can cause redness, soreness, bleeding, and swelling of the gums. This condition is usually mild and clears up with treatment.  Follow instructions from your dental care provider about how to clean your teeth.  Use a mouthwash as told by your dental care provider.  Contact a dental care provider if you have new or worsening symptoms.  Keep all follow-up visits. This is important.  This information is not intended to replace advice given to you by your health care provider. Make sure you discuss any questions you have with your health care provider. Gingivitis  Gingivitis is inflammation of the gums. It can cause redness, soreness, bleeding, and swelling of the gums. This condition is usually mild and clears up with improved home care or treatment from a dental care provider. If left untreated, gingivitis can get worse and lead to other problems with the teeth and gums.    What are the causes?  This condition is usually caused by a buildup of a sticky substance called plaque.  Plaque is made up of mucus, bacteria, and food particles.  When plaque builds up, it reacts with the saliva in the mouth to form a hard deposit called tartar or calculus, which becomes trapped around the base of the tooth.  Plaque and tartar cause irritation of the gums, as well as a breeding ground for bacteria, that leads to gingivitis.  Gingivitis usually results from poor home care and not having regular dental cleaning of the mouth and teeth (oral hygiene).    What increases the risk?  The following factors may make you more likely to develop this condition:  Not practicing proper oral hygiene.  Eating a diet that does not provide proper nutrition.  Taking certain medicines.  Being pregnant.  Going through puberty or menopause.  Using products that contain nicotine and tobacco.  Having certain medical conditions, such as:  Diabetes.  Some viral or fungal infections.  Dry mouth.  Wearing dental appliances that do not fit properly.  What are the signs or symptoms?  Normal teeth and gums compared with teeth and gums that show plaque and gingivitis.   Symptoms of this condition include:  Gums that bleed easily, especially during flossing or brushing.  Swollen gums.  Gums that are bright red or purple.  Receding gums. This means that the gums are wearing away from the teeth so that more of the tooth is exposed.  Bad breath.  How is this diagnosed?  This condition is diagnosed with a medical history and an examination of the teeth and gums. You will also need X-rays to see if the inflammation has spread to the supporting structures of the teeth.    How is this treated?  This condition may be treated by:  Having your teeth and gums cleaned at a dental care provider's office to have the plaque and tartar removed.  Practicing good oral hygiene at home. This includes careful brushing and regular flossing.  Using a mouthwash. In some cases, a mouthwash may be prescribed or recommended.  In more advanced cases, this condition may be treated with antibiotic medicine or surgery.    Follow these instructions at home:  Using a toothbrush to brush the front and back sides of the teeth.  How to floss your teeth, with close-up showing that a person should floss the sides of each tooth.  Follow instructions from your dental care provider about how to clean your teeth. Make sure you:  Brush your teeth twice a day using a soft-bristled toothbrush.  Floss at least one time each day. It is best to floss before you brush your teeth.  Use a mouthwash as recommended by your dental care provider  Eat a well-balanced diet. Limit your intake of foods and beverages that contain sugar between meals.  See a dental care provider on a regular basis for cleaning and checkups.  Do not use any products that contain nicotine or tobacco, such as cigarettes, e-cigarettes, and chewing tobacco. If you need help quitting, ask your dental care provider.  If you were prescribed an antibiotic medicine, take it as told by your dental care provider. Do not stop using the antibiotic even if you start to feel better.  Keep all follow-up visits. This is important.  Contact a health care provider if:  You have a fever.  You have a lot of bleeding from your gums.  You have pain in your gums or teeth.  You have difficulty chewing.  You have any loose or infected teeth.  You have swollen glands in your face or neck.  Summary  Gingivitis is inflammation of the gums. It can cause redness, soreness, bleeding, and swelling of the gums. This condition is usually mild and clears up with treatment.  Follow instructions from your dental care provider about how to clean your teeth.  Use a mouthwash as told by your dental care provider.  Contact a dental care provider if you have new or worsening symptoms.  Keep all follow-up visits. This is important.  This information is not intended to replace advice given to you by your health care provider. Make sure you discuss any questions you have with your health care provider.    Our Emergency Department Referral Coordinators will be reaching out to you in the next 24-48 hours from 9:00am to 5:00pm with a follow up appointment. Please expect a phone call from the hospital in that time frame. If you do not receive a call or if you have any questions or concerns, you can reach them at   [phone]

## 2025-01-27 ENCOUNTER — EMERGENCY (EMERGENCY)
Facility: HOSPITAL | Age: 30
LOS: 0 days | Discharge: HOME | End: 2025-01-27
Attending: EMERGENCY MEDICINE
Payer: MEDICAID

## 2025-01-27 VITALS
OXYGEN SATURATION: 99 % | DIASTOLIC BLOOD PRESSURE: 94 MMHG | RESPIRATION RATE: 20 BRPM | HEART RATE: 76 BPM | TEMPERATURE: 98 F | SYSTOLIC BLOOD PRESSURE: 155 MMHG

## 2025-01-27 DIAGNOSIS — F17.200 NICOTINE DEPENDENCE, UNSPECIFIED, UNCOMPLICATED: ICD-10-CM

## 2025-01-27 DIAGNOSIS — K06.1 GINGIVAL ENLARGEMENT: ICD-10-CM

## 2025-01-27 DIAGNOSIS — K08.89 OTHER SPECIFIED DISORDERS OF TEETH AND SUPPORTING STRUCTURES: ICD-10-CM

## 2025-01-27 DIAGNOSIS — J45.909 UNSPECIFIED ASTHMA, UNCOMPLICATED: ICD-10-CM

## 2025-01-27 DIAGNOSIS — K02.9 DENTAL CARIES, UNSPECIFIED: ICD-10-CM

## 2025-01-27 PROCEDURE — 99283 EMERGENCY DEPT VISIT LOW MDM: CPT | Mod: 25

## 2025-01-27 PROCEDURE — 99283 EMERGENCY DEPT VISIT LOW MDM: CPT

## 2025-01-27 PROCEDURE — 96372 THER/PROPH/DIAG INJ SC/IM: CPT

## 2025-01-27 RX ORDER — KETOROLAC TROMETHAMINE 30 MG/ML
60 INJECTION INTRAMUSCULAR; INTRAVENOUS ONCE
Refills: 0 | Status: DISCONTINUED | OUTPATIENT
Start: 2025-01-27 | End: 2025-01-27

## 2025-01-27 RX ADMIN — KETOROLAC TROMETHAMINE 60 MILLIGRAM(S): 30 INJECTION INTRAMUSCULAR; INTRAVENOUS at 14:44

## 2025-01-27 NOTE — ED PROVIDER NOTE - NSFOLLOWUPINSTRUCTIONS_ED_ALL_ED_FT
You visited the Emergency Department for a dental issue. We recommend that you follow up with a dentist. If you have a private dentist, please contact them directly. If you do not have a dentist, we have a dental clinic that will do its best to accommodate you. To make an appointment, please call the dental clinic at [phone] and leave a voicemail or email them at [email or web address] for a response.    Dental Pain    Dental pain (toothache) may be caused by many things including tooth decay (cavities or caries), abscess or infection, or trauma. If you were prescribed an antibiotic medicine, finish all of it even if you start to feel better. Rinsing your mouth with salt water or applying ice to the painful area of your face may help with the pain. Follow up with a dentist is important in ensuring good oral health and preventing the worsening of dental disease.    SEEK IMMEDIATE MEDICAL CARE IF YOU HAVE ANY OF THE FOLLOWING SYMPTOMS: unable to open your mouth, trouble breathing or swallowing, fever, or swelling of the face, neck, or jaw.

## 2025-01-27 NOTE — ED PROVIDER NOTE - OBJECTIVE STATEMENT
29-year-old female with history of asthma presents to the ED complaining of left upper gum/dental pain after chewing on ice 2 days ago.  Patient denies any fever, chills, difficulty swallowing or weakness.

## 2025-01-28 ENCOUNTER — OUTPATIENT (OUTPATIENT)
Dept: OUTPATIENT SERVICES | Facility: HOSPITAL | Age: 30
LOS: 1 days | End: 2025-01-28
Payer: MEDICAID

## 2025-01-28 DIAGNOSIS — K02.9 DENTAL CARIES, UNSPECIFIED: ICD-10-CM

## 2025-01-28 PROCEDURE — D0220: CPT

## 2025-01-28 PROCEDURE — D7250: CPT

## 2025-01-28 PROCEDURE — D0140: CPT

## 2025-01-29 PROBLEM — Z00.00 ENCOUNTER FOR PREVENTIVE HEALTH EXAMINATION: Status: ACTIVE | Noted: 2025-01-29

## 2025-01-30 DIAGNOSIS — K02.9 DENTAL CARIES, UNSPECIFIED: ICD-10-CM

## 2025-02-11 ENCOUNTER — TRANSCRIPTION ENCOUNTER (OUTPATIENT)
Age: 30
End: 2025-02-11

## 2025-02-11 ENCOUNTER — APPOINTMENT (OUTPATIENT)
Dept: INTERNAL MEDICINE | Facility: CLINIC | Age: 30
End: 2025-02-11

## 2025-03-26 ENCOUNTER — NON-APPOINTMENT (OUTPATIENT)
Age: 30
End: 2025-03-26